# Patient Record
Sex: FEMALE | Race: WHITE | NOT HISPANIC OR LATINO | Employment: OTHER | URBAN - METROPOLITAN AREA
[De-identification: names, ages, dates, MRNs, and addresses within clinical notes are randomized per-mention and may not be internally consistent; named-entity substitution may affect disease eponyms.]

---

## 2023-04-10 DIAGNOSIS — E78.2 COMBINED HYPERLIPIDEMIA: Primary | ICD-10-CM

## 2023-04-10 PROBLEM — M96.1 LUMBAR POST-LAMINECTOMY SYNDROME: Status: ACTIVE | Noted: 2023-04-10

## 2023-04-10 PROBLEM — M46.1 INFLAMMATION OF SACROILIAC JOINT (CMS-HCC): Status: ACTIVE | Noted: 2023-04-10

## 2023-04-10 PROBLEM — M51.24 THORACIC DISC HERNIATION: Status: ACTIVE | Noted: 2023-04-10

## 2023-04-10 PROBLEM — M54.16 CHRONIC LUMBAR RADICULOPATHY: Status: ACTIVE | Noted: 2023-04-10

## 2023-04-10 PROBLEM — F41.1 GENERALIZED ANXIETY DISORDER: Status: ACTIVE | Noted: 2023-04-10

## 2023-04-10 PROBLEM — U07.1 COVID-19: Status: RESOLVED | Noted: 2023-04-10 | Resolved: 2023-04-10

## 2023-04-10 PROBLEM — I10 ESSENTIAL HYPERTENSION, BENIGN: Status: ACTIVE | Noted: 2023-04-10

## 2023-04-10 PROBLEM — M54.9 BACK PAIN: Status: ACTIVE | Noted: 2023-04-10

## 2023-04-10 PROBLEM — R92.8 ABNORMAL MAMMOGRAM: Status: ACTIVE | Noted: 2023-04-10

## 2023-04-10 PROBLEM — N39.0 UTI (URINARY TRACT INFECTION): Status: RESOLVED | Noted: 2023-04-10 | Resolved: 2023-04-10

## 2023-04-10 PROBLEM — N90.4 LICHEN SCLEROSUS OF FEMALE GENITALIA: Status: ACTIVE | Noted: 2023-04-10

## 2023-04-10 PROBLEM — M48.02 DEGENERATIVE CERVICAL SPINAL STENOSIS: Status: ACTIVE | Noted: 2023-04-10

## 2023-04-10 RX ORDER — SIMVASTATIN 40 MG/1
TABLET, FILM COATED ORAL
Qty: 90 TABLET | Refills: 3 | Status: SHIPPED | OUTPATIENT
Start: 2023-04-10 | End: 2024-02-21 | Stop reason: SDUPTHER

## 2023-04-10 RX ORDER — GABAPENTIN 300 MG/1
300 CAPSULE ORAL 2 TIMES DAILY
COMMUNITY
Start: 2020-06-03 | End: 2023-05-02 | Stop reason: SDUPTHER

## 2023-04-10 RX ORDER — MULTIVITAMIN
TABLET ORAL
COMMUNITY
End: 2023-09-25 | Stop reason: SDUPTHER

## 2023-04-10 RX ORDER — ACETAMINOPHEN 500 MG
1 TABLET ORAL DAILY
COMMUNITY

## 2023-04-10 RX ORDER — HYDROCODONE BITARTRATE AND ACETAMINOPHEN 5; 325 MG/1; MG/1
TABLET ORAL
COMMUNITY
Start: 2023-02-06 | End: 2023-09-25 | Stop reason: ALTCHOICE

## 2023-04-10 RX ORDER — LISINOPRIL AND HYDROCHLOROTHIAZIDE 20; 25 MG/1; MG/1
1 TABLET ORAL DAILY
COMMUNITY
Start: 2014-05-07 | End: 2023-04-19 | Stop reason: SINTOL

## 2023-04-10 RX ORDER — SIMVASTATIN 40 MG/1
40 TABLET, FILM COATED ORAL DAILY
COMMUNITY
Start: 2014-05-05 | End: 2023-04-17 | Stop reason: SDUPTHER

## 2023-04-10 RX ORDER — NABUMETONE 750 MG/1
TABLET, FILM COATED ORAL
COMMUNITY
Start: 2013-06-10 | End: 2023-04-24

## 2023-04-10 RX ORDER — VITAMIN E (DL,TOCOPHERYL ACET) 180 MG
1 CAPSULE ORAL DAILY
COMMUNITY
Start: 2022-02-24

## 2023-04-10 RX ORDER — LORAZEPAM 1 MG/1
TABLET ORAL
COMMUNITY

## 2023-04-17 ENCOUNTER — OFFICE VISIT (OUTPATIENT)
Dept: PRIMARY CARE | Facility: CLINIC | Age: 83
End: 2023-04-17
Payer: MEDICARE

## 2023-04-17 ENCOUNTER — LAB (OUTPATIENT)
Dept: LAB | Facility: LAB | Age: 83
End: 2023-04-17
Payer: MEDICARE

## 2023-04-17 VITALS
OXYGEN SATURATION: 98 % | BODY MASS INDEX: 27.56 KG/M2 | DIASTOLIC BLOOD PRESSURE: 66 MMHG | SYSTOLIC BLOOD PRESSURE: 130 MMHG | HEIGHT: 61 IN | HEART RATE: 75 BPM | WEIGHT: 146 LBS

## 2023-04-17 DIAGNOSIS — R82.90 FOUL SMELLING URINE: ICD-10-CM

## 2023-04-17 DIAGNOSIS — N30.00 ACUTE CYSTITIS WITHOUT HEMATURIA: ICD-10-CM

## 2023-04-17 DIAGNOSIS — I10 ESSENTIAL HYPERTENSION, BENIGN: Primary | ICD-10-CM

## 2023-04-17 DIAGNOSIS — I10 ESSENTIAL HYPERTENSION, BENIGN: ICD-10-CM

## 2023-04-17 DIAGNOSIS — E78.2 COMBINED HYPERLIPIDEMIA: ICD-10-CM

## 2023-04-17 PROBLEM — M46.1 INFLAMMATION OF SACROILIAC JOINT (CMS-HCC): Status: RESOLVED | Noted: 2023-04-10 | Resolved: 2023-04-17

## 2023-04-17 LAB
ALANINE AMINOTRANSFERASE (SGPT) (U/L) IN SER/PLAS: 20 U/L (ref 7–45)
ALBUMIN (G/DL) IN SER/PLAS: 3.6 G/DL (ref 3.4–5)
ALKALINE PHOSPHATASE (U/L) IN SER/PLAS: 68 U/L (ref 33–136)
ANION GAP IN SER/PLAS: 10 MMOL/L (ref 10–20)
ASPARTATE AMINOTRANSFERASE (SGOT) (U/L) IN SER/PLAS: 25 U/L (ref 9–39)
BASOPHILS (10*3/UL) IN BLOOD BY AUTOMATED COUNT: 0.04 X10E9/L (ref 0–0.1)
BASOPHILS/100 LEUKOCYTES IN BLOOD BY AUTOMATED COUNT: 0.7 % (ref 0–2)
BILIRUBIN TOTAL (MG/DL) IN SER/PLAS: 0.3 MG/DL (ref 0–1.2)
CALCIUM (MG/DL) IN SER/PLAS: 9.6 MG/DL (ref 8.6–10.3)
CARBON DIOXIDE, TOTAL (MMOL/L) IN SER/PLAS: 27 MMOL/L (ref 21–32)
CHLORIDE (MMOL/L) IN SER/PLAS: 96 MMOL/L (ref 98–107)
CHOLESTEROL (MG/DL) IN SER/PLAS: 125 MG/DL (ref 0–199)
CHOLESTEROL IN HDL (MG/DL) IN SER/PLAS: 45.9 MG/DL
CHOLESTEROL/HDL RATIO: 2.7
CREATININE (MG/DL) IN SER/PLAS: 0.85 MG/DL (ref 0.5–1.05)
EOSINOPHILS (10*3/UL) IN BLOOD BY AUTOMATED COUNT: 0.28 X10E9/L (ref 0–0.4)
EOSINOPHILS/100 LEUKOCYTES IN BLOOD BY AUTOMATED COUNT: 5.2 % (ref 0–6)
ERYTHROCYTE DISTRIBUTION WIDTH (RATIO) BY AUTOMATED COUNT: 12.9 % (ref 11.5–14.5)
ERYTHROCYTE MEAN CORPUSCULAR HEMOGLOBIN CONCENTRATION (G/DL) BY AUTOMATED: 32 G/DL (ref 32–36)
ERYTHROCYTE MEAN CORPUSCULAR VOLUME (FL) BY AUTOMATED COUNT: 101 FL (ref 80–100)
ERYTHROCYTES (10*6/UL) IN BLOOD BY AUTOMATED COUNT: 3.51 X10E12/L (ref 4–5.2)
GFR FEMALE: 69 ML/MIN/1.73M2
GLUCOSE (MG/DL) IN SER/PLAS: 95 MG/DL (ref 74–99)
HEMATOCRIT (%) IN BLOOD BY AUTOMATED COUNT: 35.6 % (ref 36–46)
HEMOGLOBIN (G/DL) IN BLOOD: 11.4 G/DL (ref 12–16)
IMMATURE GRANULOCYTES/100 LEUKOCYTES IN BLOOD BY AUTOMATED COUNT: 0.2 % (ref 0–0.9)
LEUKOCYTES (10*3/UL) IN BLOOD BY AUTOMATED COUNT: 5.4 X10E9/L (ref 4.4–11.3)
LYMPHOCYTES (10*3/UL) IN BLOOD BY AUTOMATED COUNT: 1.63 X10E9/L (ref 0.8–3)
LYMPHOCYTES/100 LEUKOCYTES IN BLOOD BY AUTOMATED COUNT: 30.1 % (ref 13–44)
MONOCYTES (10*3/UL) IN BLOOD BY AUTOMATED COUNT: 0.57 X10E9/L (ref 0.05–0.8)
MONOCYTES/100 LEUKOCYTES IN BLOOD BY AUTOMATED COUNT: 10.5 % (ref 2–10)
NEUTROPHILS (10*3/UL) IN BLOOD BY AUTOMATED COUNT: 2.89 X10E9/L (ref 1.6–5.5)
NEUTROPHILS/100 LEUKOCYTES IN BLOOD BY AUTOMATED COUNT: 53.3 % (ref 40–80)
NON-HDL CHOLESTEROL: 79 MG/DL
PLATELETS (10*3/UL) IN BLOOD AUTOMATED COUNT: 218 X10E9/L (ref 150–450)
POC APPEARANCE, URINE: ABNORMAL
POC BILIRUBIN, URINE: NEGATIVE
POC BLOOD, URINE: NEGATIVE
POC COLOR, URINE: YELLOW
POC GLUCOSE, URINE: NEGATIVE MG/DL
POC KETONES, URINE: NEGATIVE MG/DL
POC LEUKOCYTES, URINE: ABNORMAL
POC NITRITE,URINE: POSITIVE
POC PH, URINE: 7 PH
POC PROTEIN, URINE: NEGATIVE MG/DL
POC SPECIFIC GRAVITY, URINE: 1.01
POC UROBILINOGEN, URINE: 0.2 EU/DL
POTASSIUM (MMOL/L) IN SER/PLAS: 4.3 MMOL/L (ref 3.5–5.3)
PROTEIN TOTAL: 8 G/DL (ref 6.4–8.2)
SODIUM (MMOL/L) IN SER/PLAS: 129 MMOL/L (ref 136–145)
UREA NITROGEN (MG/DL) IN SER/PLAS: 19 MG/DL (ref 6–23)

## 2023-04-17 PROCEDURE — 36415 COLL VENOUS BLD VENIPUNCTURE: CPT

## 2023-04-17 PROCEDURE — 83718 ASSAY OF LIPOPROTEIN: CPT

## 2023-04-17 PROCEDURE — 82465 ASSAY BLD/SERUM CHOLESTEROL: CPT

## 2023-04-17 PROCEDURE — 1159F MED LIST DOCD IN RCRD: CPT | Performed by: FAMILY MEDICINE

## 2023-04-17 PROCEDURE — 1157F ADVNC CARE PLAN IN RCRD: CPT | Performed by: FAMILY MEDICINE

## 2023-04-17 PROCEDURE — 85025 COMPLETE CBC W/AUTO DIFF WBC: CPT

## 2023-04-17 PROCEDURE — 99214 OFFICE O/P EST MOD 30 MIN: CPT | Performed by: FAMILY MEDICINE

## 2023-04-17 PROCEDURE — 87086 URINE CULTURE/COLONY COUNT: CPT

## 2023-04-17 PROCEDURE — 80053 COMPREHEN METABOLIC PANEL: CPT

## 2023-04-17 PROCEDURE — 3075F SYST BP GE 130 - 139MM HG: CPT | Performed by: FAMILY MEDICINE

## 2023-04-17 PROCEDURE — 1036F TOBACCO NON-USER: CPT | Performed by: FAMILY MEDICINE

## 2023-04-17 PROCEDURE — 87186 SC STD MICRODIL/AGAR DIL: CPT

## 2023-04-17 PROCEDURE — 3078F DIAST BP <80 MM HG: CPT | Performed by: FAMILY MEDICINE

## 2023-04-17 PROCEDURE — 81003 URINALYSIS AUTO W/O SCOPE: CPT | Performed by: FAMILY MEDICINE

## 2023-04-17 PROCEDURE — 87077 CULTURE AEROBIC IDENTIFY: CPT

## 2023-04-17 PROCEDURE — 1160F RVW MEDS BY RX/DR IN RCRD: CPT | Performed by: FAMILY MEDICINE

## 2023-04-17 RX ORDER — NITROFURANTOIN (MACROCRYSTALS) 100 MG/1
100 CAPSULE ORAL 2 TIMES DAILY
Qty: 10 CAPSULE | Refills: 0 | Status: SHIPPED | OUTPATIENT
Start: 2023-04-17 | End: 2023-04-22

## 2023-04-17 ASSESSMENT — PATIENT HEALTH QUESTIONNAIRE - PHQ9
1. LITTLE INTEREST OR PLEASURE IN DOING THINGS: NOT AT ALL
SUM OF ALL RESPONSES TO PHQ9 QUESTIONS 1 AND 2: 0
2. FEELING DOWN, DEPRESSED OR HOPELESS: NOT AT ALL

## 2023-04-17 ASSESSMENT — ENCOUNTER SYMPTOMS
UNEXPECTED WEIGHT CHANGE: 0
NAUSEA: 0
APPETITE CHANGE: 0

## 2023-04-17 NOTE — PROGRESS NOTES
"Subjective   Patient ID: Kavita Butts is a 82 y.o. female who presents for UTI (R/O UTI, urine has been smelling foul for about a month. Stopped the Melatonin thought it might be causing the odor, \"terribly repulsive\"/Saw Dr. Alexander uro gynocologist she \"jacked up the bladder and shortened the vagina\").    UTI   Pertinent negatives include no nausea.      Fever chills no nausea vomiting no chest pain shortness of breath or edema  Blood pressure and cholesterol are treated  Review of Systems   Constitutional:  Negative for appetite change and unexpected weight change.   Eyes:  Negative for visual disturbance.   Gastrointestinal:  Negative for nausea.       Objective   /66   Pulse 75   Ht 1.549 m (5' 1\")   Wt 66.2 kg (146 lb)   SpO2 98%   BMI 27.59 kg/m²     Physical Exam  HENT:      Head: Normocephalic and atraumatic.      Nose: Nose normal.      Mouth/Throat:      Mouth: Mucous membranes are moist.      Pharynx: No oropharyngeal exudate.   Eyes:      Extraocular Movements: Extraocular movements intact.      Conjunctiva/sclera: Conjunctivae normal.      Pupils: Pupils are equal, round, and reactive to light.   Cardiovascular:      Rate and Rhythm: Normal rate and regular rhythm.   Pulmonary:      Effort: Pulmonary effort is normal.   Abdominal:      General: There is no distension.      Palpations: Abdomen is soft.   Musculoskeletal:      Cervical back: Normal range of motion and neck supple.   Lymphadenopathy:      Cervical: No cervical adenopathy.   Neurological:      General: No focal deficit present.      Mental Status: She is alert.   Psychiatric:         Attention and Perception: Attention normal.         Speech: Speech normal.         Behavior: Behavior is cooperative.     No CVAT    Assessment/Plan   Diagnoses and all orders for this visit:  Essential hypertension, benign  Comments:  Controlled  Orders:  -     CBC and Auto Differential; Future  -     Comprehensive Metabolic Panel; Future  -    "  Lipid Panel Non-Fasting; Future  Foul smelling urine  Comments:  UA urine culture  Orders:  -     POCT UA Automated manually resulted  Combined hyperlipidemia  Comments:  Treated  Orders:  -     CBC and Auto Differential; Future  -     Comprehensive Metabolic Panel; Future  -     Lipid Panel Non-Fasting; Future  Acute cystitis without hematuria  Comments:  Add antibiotic and push water  Orders:  -     Urine Culture  -     nitrofurantoin (Macrodantin) 100 mg capsule; Take 1 capsule (100 mg) by mouth in the morning and 1 capsule (100 mg) before bedtime. Do all this for 5 days.  Recheck 6 months sooner if any problems arise

## 2023-04-19 ENCOUNTER — TELEPHONE (OUTPATIENT)
Dept: PRIMARY CARE | Facility: CLINIC | Age: 83
End: 2023-04-19
Payer: MEDICARE

## 2023-04-19 DIAGNOSIS — I10 ESSENTIAL HYPERTENSION, BENIGN: ICD-10-CM

## 2023-04-19 DIAGNOSIS — E87.1 HYPONATREMIA: ICD-10-CM

## 2023-04-19 NOTE — TELEPHONE ENCOUNTER
----- Message from Dalton Norris MD sent at 4/18/2023  9:56 AM EDT -----  Sugar and kidney function as well as liver tests all look normal  Patient has a very slight anemia, I would encourage her to eat a well-balanced diet and consider adding a once a day Centrum Silver type vitamin  Cholesterol all looks good  Sodium and chloride are low and the blood, we will need to readjust medication as a result  Please stop lisinopril hydrochlorothiazide 20/25 and start lisinopril hydrochlorothiazide 20/12.5 mg daily  Please write for prescription for new medication and set up for me and DC previous dosing  Recheck BMP 1 month

## 2023-04-19 NOTE — TELEPHONE ENCOUNTER
Pt. Notified and agreeable, would like script to [facility] and will repeat BMP in 1 month with also switch up her MVI

## 2023-04-20 LAB — URINE CULTURE: ABNORMAL

## 2023-04-20 RX ORDER — LISINOPRIL AND HYDROCHLOROTHIAZIDE 12.5; 2 MG/1; MG/1
1 TABLET ORAL DAILY
Qty: 90 TABLET | Refills: 0 | Status: SHIPPED | OUTPATIENT
Start: 2023-04-20 | End: 2023-11-28 | Stop reason: SDUPTHER

## 2023-04-20 NOTE — TELEPHONE ENCOUNTER
Pt. Notified. Of culture result. Also contacted [facility] and they have received the correct dose of Lisinopril hydrochlorothiazide 20/12.5mg, pt. Notified and will  tomorrow.

## 2023-04-20 NOTE — TELEPHONE ENCOUNTER
PT called regarding refill request / change in dose. It looks like the pharmacy sent a request to confirm the medication (PT states Lisinopril) Please call PT, requested

## 2023-04-24 DIAGNOSIS — M48.02 DEGENERATIVE CERVICAL SPINAL STENOSIS: Primary | ICD-10-CM

## 2023-04-24 PROBLEM — Q67.5 CONGENITAL DEFORMITY OF SPINE: Status: ACTIVE | Noted: 2023-01-18

## 2023-04-24 PROBLEM — I10 HYPERTENSION: Status: ACTIVE | Noted: 2023-04-24

## 2023-04-24 RX ORDER — LANOLIN ALCOHOL/MO/W.PET/CERES
1000 CREAM (GRAM) TOPICAL DAILY
COMMUNITY
Start: 2023-01-13 | End: 2023-09-25 | Stop reason: ALTCHOICE

## 2023-04-24 RX ORDER — DOCUSATE SODIUM 100 MG/1
CAPSULE, LIQUID FILLED ORAL
COMMUNITY
Start: 2023-01-20

## 2023-04-24 RX ORDER — NABUMETONE 750 MG/1
TABLET, FILM COATED ORAL
Qty: 90 TABLET | Refills: 3 | Status: SHIPPED | OUTPATIENT
Start: 2023-04-24 | End: 2023-10-17 | Stop reason: SDUPTHER

## 2023-04-24 RX ORDER — ACETAMINOPHEN 500 MG
125 TABLET ORAL
COMMUNITY
Start: 2023-01-13 | End: 2023-09-25 | Stop reason: SDUPTHER

## 2023-05-01 DIAGNOSIS — M54.16 CHRONIC LUMBAR RADICULOPATHY: ICD-10-CM

## 2023-05-01 NOTE — TELEPHONE ENCOUNTER
Rx Refill Request Telephone Encounter    Name:  Kavita Butts  :  [id number]  Medication Name:  Gabapentin  300 mg    bid  90 days    Specific Pharmacy location:  [facility]  Date of last appointment:  2023  Date of next appointment:  na  Best number to reach patient:  [phone]

## 2023-05-02 RX ORDER — GABAPENTIN 300 MG/1
300 CAPSULE ORAL 2 TIMES DAILY
Qty: 180 CAPSULE | Refills: 1 | Status: SHIPPED | OUTPATIENT
Start: 2023-05-02 | End: 2023-10-17 | Stop reason: SDUPTHER

## 2023-05-22 ENCOUNTER — LAB (OUTPATIENT)
Dept: LAB | Facility: LAB | Age: 83
End: 2023-05-22
Payer: MEDICARE

## 2023-05-22 DIAGNOSIS — I10 ESSENTIAL HYPERTENSION, BENIGN: ICD-10-CM

## 2023-05-22 DIAGNOSIS — E87.1 HYPONATREMIA: ICD-10-CM

## 2023-05-22 LAB
ANION GAP IN SER/PLAS: 11 MMOL/L (ref 10–20)
CALCIUM (MG/DL) IN SER/PLAS: 9.7 MG/DL (ref 8.6–10.3)
CARBON DIOXIDE, TOTAL (MMOL/L) IN SER/PLAS: 29 MMOL/L (ref 21–32)
CHLORIDE (MMOL/L) IN SER/PLAS: 96 MMOL/L (ref 98–107)
CREATININE (MG/DL) IN SER/PLAS: 0.73 MG/DL (ref 0.5–1.05)
GFR FEMALE: 83 ML/MIN/1.73M2
GLUCOSE (MG/DL) IN SER/PLAS: 104 MG/DL (ref 74–99)
POTASSIUM (MMOL/L) IN SER/PLAS: 4.1 MMOL/L (ref 3.5–5.3)
SODIUM (MMOL/L) IN SER/PLAS: 132 MMOL/L (ref 136–145)
UREA NITROGEN (MG/DL) IN SER/PLAS: 15 MG/DL (ref 6–23)

## 2023-05-22 PROCEDURE — 80048 BASIC METABOLIC PNL TOTAL CA: CPT

## 2023-05-22 PROCEDURE — 36415 COLL VENOUS BLD VENIPUNCTURE: CPT

## 2023-05-23 ENCOUNTER — TELEPHONE (OUTPATIENT)
Dept: PRIMARY CARE | Facility: CLINIC | Age: 83
End: 2023-05-23
Payer: MEDICARE

## 2023-05-23 NOTE — TELEPHONE ENCOUNTER
Result Communication    Resulted Orders   Basic metabolic panel   Result Value Ref Range    Glucose 104 (H) 74 - 99 mg/dL    Sodium 132 (L) 136 - 145 mmol/L    Potassium 4.1 3.5 - 5.3 mmol/L    Chloride 96 (L) 98 - 107 mmol/L    Bicarbonate 29 21 - 32 mmol/L    Anion Gap 11 10 - 20 mmol/L    Urea Nitrogen 15 6 - 23 mg/dL    Creatinine 0.73 0.50 - 1.05 mg/dL    GFR Female 83 >90 mL/min/1.73m2      Comment:       CALCULATIONS OF ESTIMATED GFR ARE PERFORMED   USING THE 2021 CKD-EPI STUDY REFIT EQUATION   WITHOUT THE RACE VARIABLE FOR THE IDMS-TRACEABLE   CREATININE METHODS.    https://jasn.asnjournals.org/content/early/2021/09/22/ASN.7248941111    Calcium 9.7 8.6 - 10.3 mg/dL       3:24 PM      Results were successfully communicated with the patient and they acknowledged their understanding. Patient was not fasting for this test.

## 2023-08-10 ENCOUNTER — TELEPHONE (OUTPATIENT)
Dept: PRIMARY CARE | Facility: CLINIC | Age: 83
End: 2023-08-10
Payer: MEDICARE

## 2023-08-10 DIAGNOSIS — Z12.31 VISIT FOR SCREENING MAMMOGRAM: ICD-10-CM

## 2023-08-10 NOTE — TELEPHONE ENCOUNTER
Pt is requesting a yearly mammogram and is requesting a call from the nurse regarding the way this would be scheduled. PT would also like to know is she schould be scheduled to see Dr CROCKETT (Last apt 4/17/23)

## 2023-08-11 NOTE — TELEPHONE ENCOUNTER
Order placed, pt. Notified she will call to schedule. Advised JU usually likes to see her q 6 months so if she calls can arrange an appt. For Oct.

## 2023-08-29 ENCOUNTER — TELEPHONE (OUTPATIENT)
Dept: PRIMARY CARE | Facility: CLINIC | Age: 83
End: 2023-08-29
Payer: MEDICARE

## 2023-08-29 NOTE — TELEPHONE ENCOUNTER
Result Communication    Resulted Orders   BI mammo bilateral screening tomosynthesis    Narrative    Interpreted By:  CHRISTIE MANCUSO MD  MRN: [id number]  Patient Name: DANAE LANZA     STUDY:  Bilateral digital screening mammograms with tomosynthesis imaging  dated  8/28/2023.     INDICATION:  Screening.     COMPARISON:  Mammograms dated  07/23/2020 through 08/09/2022.     ACCESSION NUMBER(S):  [id number]     ORDERING CLINICIAN:  PATRICIA ABDI     TECHNIQUE:  Bilateral  CC and MLO  2D digital mammograms and digital breast  tomosynthesis mammograms were obtained.   CAD was utilized for the  interpretation of these mammograms.     FINDINGS:  There are areas of scattered fibroglandular tissue.     No suspicious  mass or asymmetry is evident.  No suspicious calcification or  architectural distortion is evident.  The breasts are similar to  prior mammograms.       Impression    No mammographic finding is evident to suggest malignancy.     BI-RADS CATEGORY:     Category: 1 - Negative.  Recommendation:  1 Year Screening.       6:08 PM      Results were successfully communicated with the patient and they acknowledged their understanding.

## 2023-09-06 ENCOUNTER — APPOINTMENT (OUTPATIENT)
Dept: PRIMARY CARE | Facility: CLINIC | Age: 83
End: 2023-09-06
Payer: MEDICARE

## 2023-09-25 ENCOUNTER — OFFICE VISIT (OUTPATIENT)
Dept: PRIMARY CARE | Facility: CLINIC | Age: 83
End: 2023-09-25
Payer: MEDICARE

## 2023-09-25 VITALS
DIASTOLIC BLOOD PRESSURE: 76 MMHG | WEIGHT: 145 LBS | BODY MASS INDEX: 27.4 KG/M2 | SYSTOLIC BLOOD PRESSURE: 138 MMHG | HEART RATE: 60 BPM | OXYGEN SATURATION: 96 %

## 2023-09-25 DIAGNOSIS — R07.89 COSTOCHONDRAL CHEST PAIN: Primary | ICD-10-CM

## 2023-09-25 DIAGNOSIS — I10 PRIMARY HYPERTENSION: ICD-10-CM

## 2023-09-25 PROCEDURE — 1036F TOBACCO NON-USER: CPT | Performed by: FAMILY MEDICINE

## 2023-09-25 PROCEDURE — 3075F SYST BP GE 130 - 139MM HG: CPT | Performed by: FAMILY MEDICINE

## 2023-09-25 PROCEDURE — 1159F MED LIST DOCD IN RCRD: CPT | Performed by: FAMILY MEDICINE

## 2023-09-25 PROCEDURE — 3078F DIAST BP <80 MM HG: CPT | Performed by: FAMILY MEDICINE

## 2023-09-25 PROCEDURE — 1160F RVW MEDS BY RX/DR IN RCRD: CPT | Performed by: FAMILY MEDICINE

## 2023-09-25 PROCEDURE — 99213 OFFICE O/P EST LOW 20 MIN: CPT | Performed by: FAMILY MEDICINE

## 2023-09-25 PROCEDURE — 1126F AMNT PAIN NOTED NONE PRSNT: CPT | Performed by: FAMILY MEDICINE

## 2023-09-25 RX ORDER — ASCORBIC ACID 300 MG
TABLET,CHEWABLE ORAL DAILY
COMMUNITY

## 2023-09-25 RX ORDER — PREDNISONE 10 MG/1
TABLET ORAL
Qty: 14 TABLET | Refills: 0 | Status: SHIPPED | OUTPATIENT
Start: 2023-09-25 | End: 2023-10-03

## 2023-09-25 ASSESSMENT — ENCOUNTER SYMPTOMS
PAIN: 1
APPETITE CHANGE: 0
UNEXPECTED WEIGHT CHANGE: 0
NAUSEA: 0

## 2023-09-25 NOTE — PROGRESS NOTES
Subjective   Patient ID: Kavita Butts is a 83 y.o. female who presents for Pain (Under L rib cage, it's a burning pain and some tenderness to touch the rib cage. has been coughing a lot with allergy thinks it was due to mold on echinacea she cut back, has always had trouble with arnold natalia ).    Pain  Pertinent negatives include no nausea.   Started with a lot of coughing due to allergies  Her cough is much improved but her ribs are tender  No deformity no fever chills    Review of Systems   Constitutional:  Negative for appetite change and unexpected weight change.   Eyes:  Negative for visual disturbance.   Gastrointestinal:  Negative for nausea.       Objective   /76   Pulse 60   Wt 65.8 kg (145 lb)   SpO2 96%   BMI 27.40 kg/m²     Physical Exam  HENT:      Head: Normocephalic and atraumatic.      Nose: Nose normal.      Mouth/Throat:      Mouth: Mucous membranes are moist.      Pharynx: No oropharyngeal exudate.   Eyes:      Extraocular Movements: Extraocular movements intact.      Conjunctiva/sclera: Conjunctivae normal.      Pupils: Pupils are equal, round, and reactive to light.   Cardiovascular:      Rate and Rhythm: Normal rate and regular rhythm.   Pulmonary:      Effort: Pulmonary effort is normal.   Abdominal:      General: There is no distension.      Palpations: Abdomen is soft.   Musculoskeletal:      Cervical back: Normal range of motion and neck supple.   Lymphadenopathy:      Cervical: No cervical adenopathy.   Neurological:      General: No focal deficit present.      Mental Status: She is alert.   Psychiatric:         Attention and Perception: Attention normal.         Speech: Speech normal.         Behavior: Behavior is cooperative.     Left inferior costal margin tender from near xiphoid to mid axillary line    Assessment/Plan   Diagnoses and all orders for this visit:  Costochondral chest pain  Comments:  Add medication and discussed conservative care  Orders:  -     predniSONE  (Deltasone) 10 mg tablet; Take 1 tablet (10 mg) by mouth 3 times a day for 2 days, THEN 1 tablet (10 mg) 2 times a day for 2 days, THEN 1 tablet (10 mg) once daily for 4 days.  Primary hypertension  Comments:  Treated and controlled  Reviewed labs  Recheck 1 week if not better sooner if worse

## 2023-10-16 PROBLEM — M48.061 LUMBAR SPINAL STENOSIS: Status: ACTIVE | Noted: 2023-10-16

## 2023-10-16 PROBLEM — R35.0 URINARY FREQUENCY: Status: ACTIVE | Noted: 2023-10-16

## 2023-10-17 ENCOUNTER — OFFICE VISIT (OUTPATIENT)
Dept: PRIMARY CARE | Facility: CLINIC | Age: 83
End: 2023-10-17
Payer: MEDICARE

## 2023-10-17 VITALS
OXYGEN SATURATION: 96 % | SYSTOLIC BLOOD PRESSURE: 128 MMHG | HEIGHT: 61 IN | BODY MASS INDEX: 27.72 KG/M2 | DIASTOLIC BLOOD PRESSURE: 72 MMHG | HEART RATE: 67 BPM | WEIGHT: 146.8 LBS

## 2023-10-17 DIAGNOSIS — E78.2 COMBINED HYPERLIPIDEMIA: ICD-10-CM

## 2023-10-17 DIAGNOSIS — M48.02 DEGENERATIVE CERVICAL SPINAL STENOSIS: ICD-10-CM

## 2023-10-17 DIAGNOSIS — M54.16 CHRONIC LUMBAR RADICULOPATHY: ICD-10-CM

## 2023-10-17 DIAGNOSIS — I10 ESSENTIAL HYPERTENSION, BENIGN: Primary | ICD-10-CM

## 2023-10-17 DIAGNOSIS — F41.1 GENERALIZED ANXIETY DISORDER: ICD-10-CM

## 2023-10-17 PROCEDURE — 3074F SYST BP LT 130 MM HG: CPT | Performed by: FAMILY MEDICINE

## 2023-10-17 PROCEDURE — 1036F TOBACCO NON-USER: CPT | Performed by: FAMILY MEDICINE

## 2023-10-17 PROCEDURE — 99214 OFFICE O/P EST MOD 30 MIN: CPT | Performed by: FAMILY MEDICINE

## 2023-10-17 PROCEDURE — 3078F DIAST BP <80 MM HG: CPT | Performed by: FAMILY MEDICINE

## 2023-10-17 PROCEDURE — 1159F MED LIST DOCD IN RCRD: CPT | Performed by: FAMILY MEDICINE

## 2023-10-17 PROCEDURE — 1160F RVW MEDS BY RX/DR IN RCRD: CPT | Performed by: FAMILY MEDICINE

## 2023-10-17 PROCEDURE — 1126F AMNT PAIN NOTED NONE PRSNT: CPT | Performed by: FAMILY MEDICINE

## 2023-10-17 RX ORDER — GABAPENTIN 300 MG/1
300 CAPSULE ORAL 2 TIMES DAILY
Qty: 180 CAPSULE | Refills: 1 | Status: SHIPPED | OUTPATIENT
Start: 2023-10-17 | End: 2024-01-02 | Stop reason: SDUPTHER

## 2023-10-17 RX ORDER — NABUMETONE 750 MG/1
TABLET, FILM COATED ORAL
Qty: 90 TABLET | Refills: 3 | Status: SHIPPED | OUTPATIENT
Start: 2023-10-17 | End: 2024-03-29 | Stop reason: SDUPTHER

## 2023-10-17 ASSESSMENT — ENCOUNTER SYMPTOMS
APPETITE CHANGE: 0
NAUSEA: 0
UNEXPECTED WEIGHT CHANGE: 0

## 2023-10-17 NOTE — PROGRESS NOTES
"Subjective   Patient ID: Kavita Butts is a 83 y.o. female who presents for Follow-up (6 month, tenderness on left side under ribs ).    HPI   Patient has hx of stable hypertension, hyperlipidemia.  Pt denies chest pain, shortness of breath and edema.  Patient's current treatment as listed in Rx.  Patient is compliant with treatment and complains of no side effects associated treatment.    Review of Systems   Constitutional:  Negative for appetite change and unexpected weight change.   Eyes:  Negative for visual disturbance.   Gastrointestinal:  Negative for nausea.       Objective   /72   Pulse 67   Ht 1.549 m (5' 1\")   Wt 66.6 kg (146 lb 12.8 oz)   SpO2 96%   BMI 27.74 kg/m²     Physical Exam  HENT:      Head: Normocephalic and atraumatic.      Nose: Nose normal.      Mouth/Throat:      Mouth: Mucous membranes are moist.      Pharynx: No oropharyngeal exudate.   Eyes:      Extraocular Movements: Extraocular movements intact.      Conjunctiva/sclera: Conjunctivae normal.      Pupils: Pupils are equal, round, and reactive to light.   Cardiovascular:      Rate and Rhythm: Normal rate and regular rhythm.   Pulmonary:      Effort: Pulmonary effort is normal.   Abdominal:      General: There is no distension.      Palpations: Abdomen is soft.   Musculoskeletal:      Cervical back: Normal range of motion and neck supple.   Lymphadenopathy:      Cervical: No cervical adenopathy.   Neurological:      General: No focal deficit present.      Mental Status: She is alert.   Psychiatric:         Attention and Perception: Attention normal.         Speech: Speech normal.         Behavior: Behavior is cooperative.         Assessment/Plan   Diagnoses and all orders for this visit:  Essential hypertension, benign  Comments:  Treated and controlled  Orders:  -     Lipid Panel; Future  -     CBC and Auto Differential; Future  -     Comprehensive Metabolic Panel; Future  Chronic lumbar radiculopathy  Comments:  Controlled " with medication  Orders:  -     gabapentin (Neurontin) 300 mg capsule; Take 1 capsule (300 mg) by mouth 2 times a day.  -     Lipid Panel; Future  -     CBC and Auto Differential; Future  -     Comprehensive Metabolic Panel; Future  Degenerative cervical spinal stenosis  -     nabumetone (Relafen) 750 mg tablet; TAKE ONE-HALF (1/2) TABLET TWICE A DAY AS NEEDED  -     Lipid Panel; Future  -     CBC and Auto Differential; Future  -     Comprehensive Metabolic Panel; Future  Combined hyperlipidemia  Comments:  Treated and controlled  Orders:  -     Lipid Panel; Future  -     CBC and Auto Differential; Future  -     Comprehensive Metabolic Panel; Future  Generalized anxiety disorder  -     Lipid Panel; Future  -     CBC and Auto Differential; Future  -     Comprehensive Metabolic Panel; Future  HM LM discussed  Recheck 6 months sooner if any issues arise

## 2023-10-18 ENCOUNTER — LAB (OUTPATIENT)
Dept: LAB | Facility: LAB | Age: 83
End: 2023-10-18
Payer: MEDICARE

## 2023-10-18 DIAGNOSIS — E78.2 COMBINED HYPERLIPIDEMIA: ICD-10-CM

## 2023-10-18 DIAGNOSIS — F41.1 GENERALIZED ANXIETY DISORDER: ICD-10-CM

## 2023-10-18 DIAGNOSIS — M54.16 CHRONIC LUMBAR RADICULOPATHY: ICD-10-CM

## 2023-10-18 DIAGNOSIS — I10 ESSENTIAL HYPERTENSION, BENIGN: ICD-10-CM

## 2023-10-18 DIAGNOSIS — D72.819 LEUKOPENIA, UNSPECIFIED TYPE: Primary | ICD-10-CM

## 2023-10-18 DIAGNOSIS — M48.02 DEGENERATIVE CERVICAL SPINAL STENOSIS: ICD-10-CM

## 2023-10-18 LAB
ALBUMIN SERPL BCP-MCNC: 3.7 G/DL (ref 3.4–5)
ALP SERPL-CCNC: 64 U/L (ref 33–136)
ALT SERPL W P-5'-P-CCNC: 20 U/L (ref 7–45)
ANION GAP SERPL CALC-SCNC: 11 MMOL/L (ref 10–20)
AST SERPL W P-5'-P-CCNC: 26 U/L (ref 9–39)
BASOPHILS # BLD AUTO: 0.03 X10*3/UL (ref 0–0.1)
BASOPHILS NFR BLD AUTO: 0.8 %
BILIRUB SERPL-MCNC: 0.4 MG/DL (ref 0–1.2)
BUN SERPL-MCNC: 17 MG/DL (ref 6–23)
CALCIUM SERPL-MCNC: 9.2 MG/DL (ref 8.6–10.3)
CHLORIDE SERPL-SCNC: 100 MMOL/L (ref 98–107)
CHOLEST SERPL-MCNC: 116 MG/DL (ref 0–199)
CHOLESTEROL/HDL RATIO: 2.2
CO2 SERPL-SCNC: 27 MMOL/L (ref 21–32)
CREAT SERPL-MCNC: 0.74 MG/DL (ref 0.5–1.05)
EOSINOPHIL # BLD AUTO: 0.28 X10*3/UL (ref 0–0.4)
EOSINOPHIL NFR BLD AUTO: 7.3 %
ERYTHROCYTE [DISTWIDTH] IN BLOOD BY AUTOMATED COUNT: 13.2 % (ref 11.5–14.5)
GFR SERPL CREATININE-BSD FRML MDRD: 81 ML/MIN/1.73M*2
GLUCOSE SERPL-MCNC: 91 MG/DL (ref 74–99)
HCT VFR BLD AUTO: 38.8 % (ref 36–46)
HDLC SERPL-MCNC: 53.3 MG/DL
HGB BLD-MCNC: 12.4 G/DL (ref 12–16)
IMM GRANULOCYTES # BLD AUTO: 0 X10*3/UL (ref 0–0.5)
IMM GRANULOCYTES NFR BLD AUTO: 0 % (ref 0–0.9)
LDLC SERPL CALC-MCNC: 52 MG/DL
LYMPHOCYTES # BLD AUTO: 1.03 X10*3/UL (ref 0.8–3)
LYMPHOCYTES NFR BLD AUTO: 27 %
MCH RBC QN AUTO: 32.6 PG (ref 26–34)
MCHC RBC AUTO-ENTMCNC: 32 G/DL (ref 32–36)
MCV RBC AUTO: 102 FL (ref 80–100)
MONOCYTES # BLD AUTO: 0.46 X10*3/UL (ref 0.05–0.8)
MONOCYTES NFR BLD AUTO: 12.1 %
NEUTROPHILS # BLD AUTO: 2.01 X10*3/UL (ref 1.6–5.5)
NEUTROPHILS NFR BLD AUTO: 52.8 %
NON HDL CHOLESTEROL: 63 MG/DL (ref 0–149)
NRBC BLD-RTO: 0 /100 WBCS (ref 0–0)
PLATELET # BLD AUTO: 210 X10*3/UL (ref 150–450)
PMV BLD AUTO: 9.8 FL (ref 7.5–11.5)
POTASSIUM SERPL-SCNC: 4.2 MMOL/L (ref 3.5–5.3)
PROT SERPL-MCNC: 7.8 G/DL (ref 6.4–8.2)
RBC # BLD AUTO: 3.8 X10*6/UL (ref 4–5.2)
SODIUM SERPL-SCNC: 134 MMOL/L (ref 136–145)
TRIGL SERPL-MCNC: 53 MG/DL (ref 0–149)
VLDL: 11 MG/DL (ref 0–40)
WBC # BLD AUTO: 3.8 X10*3/UL (ref 4.4–11.3)

## 2023-10-18 PROCEDURE — 80061 LIPID PANEL: CPT

## 2023-10-18 PROCEDURE — 85025 COMPLETE CBC W/AUTO DIFF WBC: CPT

## 2023-10-18 PROCEDURE — 36415 COLL VENOUS BLD VENIPUNCTURE: CPT

## 2023-10-18 PROCEDURE — 80053 COMPREHEN METABOLIC PANEL: CPT

## 2023-10-19 ENCOUNTER — TELEPHONE (OUTPATIENT)
Dept: PRIMARY CARE | Facility: CLINIC | Age: 83
End: 2023-10-19
Payer: MEDICARE

## 2023-10-19 NOTE — TELEPHONE ENCOUNTER
Result Communication    Resulted Orders   Lipid Panel   Result Value Ref Range    Cholesterol 116 0 - 199 mg/dL      Comment:            Age      Desirable   Borderline High   High     0-19 Y     0 - 169       170 - 199     >/= 200    20-24 Y     0 - 189       190 - 224     >/= 225         >24 Y     0 - 199       200 - 239     >/= 240   **All ranges are based on fasting samples. Specific   therapeutic targets will vary based on patient-specific   cardiac risk.    Pediatric guidelines reference:Pediatrics 2011, 128(S5).Adult guidelines reference: NCEP ATPIII Guidelines,HINA 2001, 258:2486-97    Venipuncture immediately after or during the administration of Metamizole may lead to falsely low results. Testing should be performed immediately prior to Metamizole dosing.    HDL-Cholesterol 53.3 mg/dL      Comment:        Age       Very Low   Low     Normal    High    0-19 Y    < 35      < 40     40-45     ----  20-24 Y    ----     < 40      >45      ----        >24 Y      ----     < 40     40-60      >60      Cholesterol/HDL Ratio 2.2       Comment:        Ref Values  Desirable  < 3.4  High Risk  > 5.0    LDL Calculated 52 <=99 mg/dL      Comment:                                  Near   Borderline      AGE      Desirable  Optimal    High     High     Very High     0-19 Y     0 - 109     ---    110-129   >/= 130     ----    20-24 Y     0 - 119     ---    120-159   >/= 160     ----      >24 Y     0 -  99   100-129  130-159   160-189     >/=190      VLDL 11 0 - 40 mg/dL    Triglycerides 53 0 - 149 mg/dL      Comment:         Age         Desirable   Borderline High   High     Very High   0 D-90 D    19 - 174         ----         ----        ----  91 D- 9 Y     0 -  74        75 -  99     >/= 100      ----    10-19 Y     0 -  89        90 - 129     >/= 130      ----    20-24 Y     0 - 114       115 - 149     >/= 150      ----         >24 Y     0 - 149       150 - 199    200- 499    >/= 500    Venipuncture immediately after or  during the administration of Metamizole may lead to falsely low results. Testing should be performed immediately prior to Metamizole dosing.    Non HDL Cholesterol 63 0 - 149 mg/dL      Comment:            Age       Desirable   Borderline High   High     Very High     0-19 Y     0 - 119       120 - 144     >/= 145    >/= 160    20-24 Y     0 - 149       150 - 189     >/= 190      ----         >24 Y    30 mg/dL above LDL Cholesterol goal     CBC and Auto Differential   Result Value Ref Range    WBC 3.8 (L) 4.4 - 11.3 x10*3/uL    nRBC 0.0 0.0 - 0.0 /100 WBCs    RBC 3.80 (L) 4.00 - 5.20 x10*6/uL    Hemoglobin 12.4 12.0 - 16.0 g/dL    Hematocrit 38.8 36.0 - 46.0 %     (H) 80 - 100 fL    MCH 32.6 26.0 - 34.0 pg    MCHC 32.0 32.0 - 36.0 g/dL    RDW 13.2 11.5 - 14.5 %    Platelets 210 150 - 450 x10*3/uL    MPV 9.8 7.5 - 11.5 fL    Neutrophils % 52.8 40.0 - 80.0 %    Immature Granulocytes %, Automated 0.0 0.0 - 0.9 %      Comment:      Immature Granulocyte Count (IG) includes promyelocytes, myelocytes and metamyelocytes but does not include bands. Percent differential counts (%) should be interpreted in the context of the absolute cell counts (cells/UL).    Lymphocytes % 27.0 13.0 - 44.0 %    Monocytes % 12.1 2.0 - 10.0 %    Eosinophils % 7.3 0.0 - 6.0 %    Basophils % 0.8 0.0 - 2.0 %    Neutrophils Absolute 2.01 1.60 - 5.50 x10*3/uL      Comment:      Percent differential counts (%) should be interpreted in the context of the absolute cell counts (cells/uL).    Immature Granulocytes Absolute, Automated 0.00 0.00 - 0.50 x10*3/uL    Lymphocytes Absolute 1.03 0.80 - 3.00 x10*3/uL    Monocytes Absolute 0.46 0.05 - 0.80 x10*3/uL    Eosinophils Absolute 0.28 0.00 - 0.40 x10*3/uL    Basophils Absolute 0.03 0.00 - 0.10 x10*3/uL   Comprehensive Metabolic Panel   Result Value Ref Range    Glucose 91 74 - 99 mg/dL    Sodium 134 (L) 136 - 145 mmol/L    Potassium 4.2 3.5 - 5.3 mmol/L    Chloride 100 98 - 107 mmol/L     Bicarbonate 27 21 - 32 mmol/L    Anion Gap 11 10 - 20 mmol/L    Urea Nitrogen 17 6 - 23 mg/dL    Creatinine 0.74 0.50 - 1.05 mg/dL    eGFR 81 >60 mL/min/1.73m*2      Comment:      Calculations of estimated GFR are performed using the 2021 CKD-EPI Study Refit equation without the race variable for the IDMS-Traceable creatinine methods.  https://jasn.asnjournals.org/content/early/2021/09/22/ASN.7528791688    Calcium 9.2 8.6 - 10.3 mg/dL    Albumin 3.7 3.4 - 5.0 g/dL    Alkaline Phosphatase 64 33 - 136 U/L    Total Protein 7.8 6.4 - 8.2 g/dL    AST 26 9 - 39 U/L    Bilirubin, Total 0.4 0.0 - 1.2 mg/dL    ALT 20 7 - 45 U/L      Comment:      Patients treated with Sulfasalazine may generate falsely decreased results for ALT.       10:38 AM  Your white blood cell count is mildly decreased. Recommend rechecking in about a month, and follow-up with Dr. Norris.     Your sodium is slightly decreased, but appears stable for at least the last 2 years. ...   Written by Nikunj Dewey DO on 10/18/2023  4:48 PM EDT View Full Comments    Results were successfully communicated with the patient and they acknowledged their understanding.

## 2023-10-19 NOTE — TELEPHONE ENCOUNTER
----- Message from Nikunj Dewey DO sent at 10/18/2023  4:48 PM EDT -----  Please make sure patient is aware of the comments or MyChart message.

## 2023-11-17 ENCOUNTER — LAB (OUTPATIENT)
Dept: LAB | Facility: LAB | Age: 83
End: 2023-11-17
Payer: MEDICARE

## 2023-11-17 DIAGNOSIS — D72.819 LEUKOPENIA, UNSPECIFIED TYPE: ICD-10-CM

## 2023-11-17 LAB
BASOPHILS # BLD AUTO: 0.04 X10*3/UL (ref 0–0.1)
BASOPHILS NFR BLD AUTO: 0.8 %
EOSINOPHIL # BLD AUTO: 0.52 X10*3/UL (ref 0–0.4)
EOSINOPHIL NFR BLD AUTO: 10.1 %
ERYTHROCYTE [DISTWIDTH] IN BLOOD BY AUTOMATED COUNT: 13.2 % (ref 11.5–14.5)
HCT VFR BLD AUTO: 37.8 % (ref 36–46)
HGB BLD-MCNC: 12.2 G/DL (ref 12–16)
IMM GRANULOCYTES # BLD AUTO: 0.02 X10*3/UL (ref 0–0.5)
IMM GRANULOCYTES NFR BLD AUTO: 0.4 % (ref 0–0.9)
LYMPHOCYTES # BLD AUTO: 1.4 X10*3/UL (ref 0.8–3)
LYMPHOCYTES NFR BLD AUTO: 27.1 %
MCH RBC QN AUTO: 33.1 PG (ref 26–34)
MCHC RBC AUTO-ENTMCNC: 32.3 G/DL (ref 32–36)
MCV RBC AUTO: 102 FL (ref 80–100)
MONOCYTES # BLD AUTO: 0.48 X10*3/UL (ref 0.05–0.8)
MONOCYTES NFR BLD AUTO: 9.3 %
NEUTROPHILS # BLD AUTO: 2.7 X10*3/UL (ref 1.6–5.5)
NEUTROPHILS NFR BLD AUTO: 52.3 %
NRBC BLD-RTO: 0 /100 WBCS (ref 0–0)
PLATELET # BLD AUTO: 245 X10*3/UL (ref 150–450)
RBC # BLD AUTO: 3.69 X10*6/UL (ref 4–5.2)
WBC # BLD AUTO: 5.2 X10*3/UL (ref 4.4–11.3)

## 2023-11-17 PROCEDURE — 85025 COMPLETE CBC W/AUTO DIFF WBC: CPT

## 2023-11-17 PROCEDURE — 36415 COLL VENOUS BLD VENIPUNCTURE: CPT

## 2023-11-28 ENCOUNTER — OFFICE VISIT (OUTPATIENT)
Dept: PRIMARY CARE | Facility: CLINIC | Age: 83
End: 2023-11-28
Payer: MEDICARE

## 2023-11-28 VITALS
SYSTOLIC BLOOD PRESSURE: 126 MMHG | OXYGEN SATURATION: 99 % | DIASTOLIC BLOOD PRESSURE: 68 MMHG | HEART RATE: 81 BPM | TEMPERATURE: 98.4 F

## 2023-11-28 DIAGNOSIS — I10 ESSENTIAL HYPERTENSION, BENIGN: ICD-10-CM

## 2023-11-28 DIAGNOSIS — E78.2 COMBINED HYPERLIPIDEMIA: Primary | ICD-10-CM

## 2023-11-28 DIAGNOSIS — J01.00 ACUTE MAXILLARY SINUSITIS, RECURRENCE NOT SPECIFIED: ICD-10-CM

## 2023-11-28 PROCEDURE — 99214 OFFICE O/P EST MOD 30 MIN: CPT | Performed by: FAMILY MEDICINE

## 2023-11-28 PROCEDURE — 1126F AMNT PAIN NOTED NONE PRSNT: CPT | Performed by: FAMILY MEDICINE

## 2023-11-28 PROCEDURE — 1036F TOBACCO NON-USER: CPT | Performed by: FAMILY MEDICINE

## 2023-11-28 PROCEDURE — 3074F SYST BP LT 130 MM HG: CPT | Performed by: FAMILY MEDICINE

## 2023-11-28 PROCEDURE — 1159F MED LIST DOCD IN RCRD: CPT | Performed by: FAMILY MEDICINE

## 2023-11-28 PROCEDURE — 3078F DIAST BP <80 MM HG: CPT | Performed by: FAMILY MEDICINE

## 2023-11-28 PROCEDURE — 1160F RVW MEDS BY RX/DR IN RCRD: CPT | Performed by: FAMILY MEDICINE

## 2023-11-28 RX ORDER — LISINOPRIL AND HYDROCHLOROTHIAZIDE 12.5; 2 MG/1; MG/1
1 TABLET ORAL DAILY
Qty: 90 TABLET | Refills: 1 | Status: SHIPPED | OUTPATIENT
Start: 2023-11-28 | End: 2024-04-24 | Stop reason: SDUPTHER

## 2023-11-28 RX ORDER — AZITHROMYCIN 250 MG/1
TABLET, FILM COATED ORAL
Qty: 6 TABLET | Refills: 0 | Status: SHIPPED | OUTPATIENT
Start: 2023-11-28 | End: 2023-12-02

## 2023-11-28 ASSESSMENT — ENCOUNTER SYMPTOMS
NAUSEA: 0
UNEXPECTED WEIGHT CHANGE: 0
APPETITE CHANGE: 0
COUGH: 1

## 2023-11-28 NOTE — PROGRESS NOTES
Onset 5 days ago with cough and pain across her forehead used some cough drops coughed terribly this am and got a lot of green mucus up nasal drainage is clear feels like a clothes pin is on her nose today No fevers COVID neg test 5 days ago.   Is flying next week and would like this cleared up   Subjective   Patient ID: Kavita Butts is a 83 y.o. female who presents for Cough.    Cough     Patient is seen in the office complaining of sinus congestion.  Complains of stuffed up head, drainage, cough.  Patient complains symptoms are moderate and patient feels mildly ill.  Patient also notes associated fatigue.     Review of Systems   Constitutional:  Negative for appetite change and unexpected weight change.   Eyes:  Negative for visual disturbance.   Respiratory:  Positive for cough.    Gastrointestinal:  Negative for nausea.       Objective   /68   Pulse 81   Temp 36.9 °C (98.4 °F)   SpO2 99%     Physical Exam  HENT:      Head: Normocephalic and atraumatic.      Nose: Nose normal.      Mouth/Throat:      Mouth: Mucous membranes are moist.      Pharynx: No oropharyngeal exudate.   Eyes:      Extraocular Movements: Extraocular movements intact.      Conjunctiva/sclera: Conjunctivae normal.      Pupils: Pupils are equal, round, and reactive to light.   Cardiovascular:      Rate and Rhythm: Normal rate and regular rhythm.   Pulmonary:      Effort: Pulmonary effort is normal.   Abdominal:      General: There is no distension.      Palpations: Abdomen is soft.   Musculoskeletal:      Cervical back: Normal range of motion and neck supple.   Lymphadenopathy:      Cervical: No cervical adenopathy.   Neurological:      General: No focal deficit present.      Mental Status: She is alert.   Psychiatric:         Attention and Perception: Attention normal.         Speech: Speech normal.         Behavior: Behavior is cooperative.         Assessment/Plan   Diagnoses and all orders for this visit:  Combined  hyperlipidemia  Comments:  continue statin  stay active, follow low fat low chol diet  Essential hypertension, benign  Comments:  treated, controlled  Orders:  -     lisinopriL-hydrochlorothiazide 20-12.5 mg tablet; Take 1 tablet by mouth once daily.  Acute maxillary sinusitis, recurrence not specified  Comments:  disc risk benefits, add abx  Orders:  -     azithromycin (Zithromax) 250 mg tablet; Take 2 tablets (500 mg) by mouth once daily for 1 day, THEN 1 tablet (250 mg) once daily for 4 days.    Supportive care  Rtc 1 week if not better, sooner if worse

## 2024-01-02 DIAGNOSIS — M54.16 CHRONIC LUMBAR RADICULOPATHY: ICD-10-CM

## 2024-01-03 RX ORDER — GABAPENTIN 300 MG/1
300 CAPSULE ORAL 2 TIMES DAILY
Qty: 180 CAPSULE | Refills: 1 | Status: SHIPPED | OUTPATIENT
Start: 2024-01-03

## 2024-02-09 ENCOUNTER — OFFICE VISIT (OUTPATIENT)
Dept: PRIMARY CARE | Facility: CLINIC | Age: 84
End: 2024-02-09
Payer: MEDICARE

## 2024-02-09 VITALS
HEART RATE: 65 BPM | DIASTOLIC BLOOD PRESSURE: 78 MMHG | BODY MASS INDEX: 27.56 KG/M2 | SYSTOLIC BLOOD PRESSURE: 128 MMHG | WEIGHT: 146 LBS | OXYGEN SATURATION: 98 % | HEIGHT: 61 IN

## 2024-02-09 DIAGNOSIS — I10 ESSENTIAL HYPERTENSION, BENIGN: Primary | ICD-10-CM

## 2024-02-09 DIAGNOSIS — J01.00 ACUTE MAXILLARY SINUSITIS, RECURRENCE NOT SPECIFIED: ICD-10-CM

## 2024-02-09 LAB
POC RAPID INFLUENZA A: NEGATIVE
POC RAPID INFLUENZA B: NEGATIVE
POC SARS-COV-2 AG BINAX: NORMAL

## 2024-02-09 PROCEDURE — 1036F TOBACCO NON-USER: CPT | Performed by: FAMILY MEDICINE

## 2024-02-09 PROCEDURE — 1157F ADVNC CARE PLAN IN RCRD: CPT | Performed by: FAMILY MEDICINE

## 2024-02-09 PROCEDURE — 1160F RVW MEDS BY RX/DR IN RCRD: CPT | Performed by: FAMILY MEDICINE

## 2024-02-09 PROCEDURE — 3074F SYST BP LT 130 MM HG: CPT | Performed by: FAMILY MEDICINE

## 2024-02-09 PROCEDURE — 1126F AMNT PAIN NOTED NONE PRSNT: CPT | Performed by: FAMILY MEDICINE

## 2024-02-09 PROCEDURE — 1159F MED LIST DOCD IN RCRD: CPT | Performed by: FAMILY MEDICINE

## 2024-02-09 PROCEDURE — 87811 SARS-COV-2 COVID19 W/OPTIC: CPT | Performed by: FAMILY MEDICINE

## 2024-02-09 PROCEDURE — 87804 INFLUENZA ASSAY W/OPTIC: CPT | Performed by: FAMILY MEDICINE

## 2024-02-09 PROCEDURE — 3078F DIAST BP <80 MM HG: CPT | Performed by: FAMILY MEDICINE

## 2024-02-09 PROCEDURE — 99213 OFFICE O/P EST LOW 20 MIN: CPT | Performed by: FAMILY MEDICINE

## 2024-02-09 RX ORDER — PREDNISONE 10 MG/1
TABLET ORAL
Qty: 14 TABLET | Refills: 0 | Status: SHIPPED | OUTPATIENT
Start: 2024-02-09 | End: 2024-02-17

## 2024-02-09 RX ORDER — AMOXICILLIN 875 MG/1
875 TABLET, FILM COATED ORAL 2 TIMES DAILY
Qty: 20 TABLET | Refills: 0 | Status: SHIPPED | OUTPATIENT
Start: 2024-02-09 | End: 2024-02-19

## 2024-02-09 ASSESSMENT — ENCOUNTER SYMPTOMS
SINUS PRESSURE: 1
SINUS PAIN: 1
FATIGUE: 1
FEVER: 0
RHINORRHEA: 1

## 2024-02-09 ASSESSMENT — COLUMBIA-SUICIDE SEVERITY RATING SCALE - C-SSRS
2. HAVE YOU ACTUALLY HAD ANY THOUGHTS OF KILLING YOURSELF?: NO
6. HAVE YOU EVER DONE ANYTHING, STARTED TO DO ANYTHING, OR PREPARED TO DO ANYTHING TO END YOUR LIFE?: NO
1. IN THE PAST MONTH, HAVE YOU WISHED YOU WERE DEAD OR WISHED YOU COULD GO TO SLEEP AND NOT WAKE UP?: NO

## 2024-02-09 NOTE — PROGRESS NOTES
"Subjective   Patient ID: Kavita Butts is a 83 y.o. female who presents for Cough (With green phlegm ), Nasal Congestion, Headache (Front of her head), and Sore Throat (Feels like sand paper).    HPI   Started yesterday. Congestion, coughing. Pain actross frontal sinuses, maxillary sinuses, and chest. Has not been this sick in a long time.  previously had pneumonia and then he had congestion like she does. She said he's had green mucous from nose. She has been drinking water ok and tea. Steam in shower helps. Tried taking tylenol arthritis last night. Did not test for covid yet.     Loose bowels last night. Feels body fatigue, no muscle aches. No fever.     Review of Systems   Constitutional:  Positive for fatigue. Negative for fever.   HENT:  Positive for congestion, postnasal drip, rhinorrhea, sinus pressure and sinus pain.        Objective   /78   Pulse 65   Ht 1.549 m (5' 1\")   Wt 66.2 kg (146 lb)   SpO2 98%   BMI 27.59 kg/m²     Physical Exam    Assessment/Plan   Problem List Items Addressed This Visit             ICD-10-CM       Cardiac and Vasculature    Essential hypertension, benign - Primary I10     Other Visit Diagnoses         Codes    Acute maxillary sinusitis, recurrence not specified     J01.00    Relevant Medications    amoxicillin (Amoxil) 875 mg tablet    predniSONE (Deltasone) 10 mg tablet    Other Relevant Orders    POCT Influenza A/B manually resulted    POCT BinaxNOW Covid-19 Ag Card manually resulted               Plan  Maxillary sinusitis; COVID and influenza negative today. Given amoxicillin and prednisone.   Follow up in 1 week sooner if not better    Note authored by Mary Cowden, MS4  HTN controlled  Discussed risk benefits and medications as directed  RTC 1 week if not better sooner if worse  "

## 2024-02-21 DIAGNOSIS — E78.2 COMBINED HYPERLIPIDEMIA: ICD-10-CM

## 2024-02-21 RX ORDER — SIMVASTATIN 40 MG/1
40 TABLET, FILM COATED ORAL DAILY
Qty: 90 TABLET | Refills: 1 | Status: SHIPPED | OUTPATIENT
Start: 2024-02-21

## 2024-02-21 NOTE — TELEPHONE ENCOUNTER
MEDICATION REFILL    Pharmacy Name:        [facility]  Medication requested                   Simvastatin   40 mg    Dosage    1x daily  Quantity      90 days    Allergies     none given  Date of last visit    02/09/2024  Date of Next Appointment [  ]

## 2024-03-29 ENCOUNTER — TELEPHONE (OUTPATIENT)
Dept: PRIMARY CARE | Facility: CLINIC | Age: 84
End: 2024-03-29
Payer: MEDICARE

## 2024-03-29 DIAGNOSIS — M48.02 DEGENERATIVE CERVICAL SPINAL STENOSIS: ICD-10-CM

## 2024-03-29 RX ORDER — NABUMETONE 750 MG/1
TABLET, FILM COATED ORAL
Qty: 90 TABLET | Refills: 0 | Status: SHIPPED | OUTPATIENT
Start: 2024-03-29 | End: 2024-04-01 | Stop reason: SDUPTHER

## 2024-03-29 NOTE — TELEPHONE ENCOUNTER
Pharmacy: [facility]    Medication(s): nabumetone 750 mg 1/2 tab twice daily   Dose:^  Qty:   Last Office Visit: 11/28/2023  Next Office Visit: 04/24/2024

## 2024-04-01 ENCOUNTER — TELEPHONE (OUTPATIENT)
Dept: PRIMARY CARE | Facility: CLINIC | Age: 84
End: 2024-04-01
Payer: MEDICARE

## 2024-04-01 DIAGNOSIS — M48.02 DEGENERATIVE CERVICAL SPINAL STENOSIS: ICD-10-CM

## 2024-04-01 RX ORDER — NABUMETONE 750 MG/1
TABLET, FILM COATED ORAL
Qty: 90 TABLET | Refills: 0 | Status: SHIPPED | OUTPATIENT
Start: 2024-04-01

## 2024-04-01 NOTE — TELEPHONE ENCOUNTER
Pt states [facility] was sent her refill for nabumetone 750 mg table, 1/2 tablet twice a day.  However, her pharmacy changed at the beginning of the year to [facility]/. Can the Rx be sent to [facility]/ for her?

## 2024-04-24 ENCOUNTER — OFFICE VISIT (OUTPATIENT)
Dept: PRIMARY CARE | Facility: CLINIC | Age: 84
End: 2024-04-24
Payer: MEDICARE

## 2024-04-24 ENCOUNTER — LAB (OUTPATIENT)
Dept: LAB | Facility: LAB | Age: 84
End: 2024-04-24
Payer: MEDICARE

## 2024-04-24 VITALS
DIASTOLIC BLOOD PRESSURE: 75 MMHG | OXYGEN SATURATION: 98 % | HEART RATE: 72 BPM | HEIGHT: 61 IN | WEIGHT: 150 LBS | SYSTOLIC BLOOD PRESSURE: 135 MMHG | BODY MASS INDEX: 28.32 KG/M2

## 2024-04-24 DIAGNOSIS — Z79.899 MEDICATION MANAGEMENT: ICD-10-CM

## 2024-04-24 DIAGNOSIS — Z00.00 ROUTINE GENERAL MEDICAL EXAMINATION AT HEALTH CARE FACILITY: Primary | ICD-10-CM

## 2024-04-24 DIAGNOSIS — I10 ESSENTIAL HYPERTENSION, BENIGN: ICD-10-CM

## 2024-04-24 DIAGNOSIS — F41.1 GENERALIZED ANXIETY DISORDER: ICD-10-CM

## 2024-04-24 DIAGNOSIS — E78.2 COMBINED HYPERLIPIDEMIA: ICD-10-CM

## 2024-04-24 LAB
ANION GAP SERPL CALC-SCNC: 12 MMOL/L (ref 10–20)
BUN SERPL-MCNC: 16 MG/DL (ref 6–23)
CALCIUM SERPL-MCNC: 9.9 MG/DL (ref 8.6–10.3)
CHLORIDE SERPL-SCNC: 97 MMOL/L (ref 98–107)
CO2 SERPL-SCNC: 28 MMOL/L (ref 21–32)
CREAT SERPL-MCNC: 0.7 MG/DL (ref 0.5–1.05)
EGFRCR SERPLBLD CKD-EPI 2021: 87 ML/MIN/1.73M*2
GLUCOSE SERPL-MCNC: 97 MG/DL (ref 74–99)
POTASSIUM SERPL-SCNC: 3.8 MMOL/L (ref 3.5–5.3)
SODIUM SERPL-SCNC: 133 MMOL/L (ref 136–145)

## 2024-04-24 PROCEDURE — 80048 BASIC METABOLIC PNL TOTAL CA: CPT

## 2024-04-24 PROCEDURE — 1170F FXNL STATUS ASSESSED: CPT | Performed by: FAMILY MEDICINE

## 2024-04-24 PROCEDURE — 1157F ADVNC CARE PLAN IN RCRD: CPT | Performed by: FAMILY MEDICINE

## 2024-04-24 PROCEDURE — 1159F MED LIST DOCD IN RCRD: CPT | Performed by: FAMILY MEDICINE

## 2024-04-24 PROCEDURE — 99214 OFFICE O/P EST MOD 30 MIN: CPT | Performed by: FAMILY MEDICINE

## 2024-04-24 PROCEDURE — 3078F DIAST BP <80 MM HG: CPT | Performed by: FAMILY MEDICINE

## 2024-04-24 PROCEDURE — 1036F TOBACCO NON-USER: CPT | Performed by: FAMILY MEDICINE

## 2024-04-24 PROCEDURE — G0439 PPPS, SUBSEQ VISIT: HCPCS | Performed by: FAMILY MEDICINE

## 2024-04-24 PROCEDURE — 1160F RVW MEDS BY RX/DR IN RCRD: CPT | Performed by: FAMILY MEDICINE

## 2024-04-24 PROCEDURE — 3075F SYST BP GE 130 - 139MM HG: CPT | Performed by: FAMILY MEDICINE

## 2024-04-24 PROCEDURE — 36415 COLL VENOUS BLD VENIPUNCTURE: CPT

## 2024-04-24 RX ORDER — LISINOPRIL AND HYDROCHLOROTHIAZIDE 12.5; 2 MG/1; MG/1
1 TABLET ORAL DAILY
Qty: 90 TABLET | Refills: 1 | Status: SHIPPED | OUTPATIENT
Start: 2024-04-24 | End: 2024-10-21

## 2024-04-24 RX ORDER — LORAZEPAM 1 MG/1
TABLET ORAL
Status: CANCELLED | OUTPATIENT
Start: 2024-04-24

## 2024-04-24 ASSESSMENT — ACTIVITIES OF DAILY LIVING (ADL)
GROCERY_SHOPPING: INDEPENDENT
BATHING: INDEPENDENT
DRESSING: INDEPENDENT
MANAGING_FINANCES: INDEPENDENT
TAKING_MEDICATION: INDEPENDENT
DOING_HOUSEWORK: INDEPENDENT

## 2024-04-24 ASSESSMENT — ENCOUNTER SYMPTOMS
NAUSEA: 0
UNEXPECTED WEIGHT CHANGE: 0
APPETITE CHANGE: 0

## 2024-04-24 ASSESSMENT — PATIENT HEALTH QUESTIONNAIRE - PHQ9
1. LITTLE INTEREST OR PLEASURE IN DOING THINGS: NOT AT ALL
2. FEELING DOWN, DEPRESSED OR HOPELESS: MORE THAN HALF THE DAYS
SUM OF ALL RESPONSES TO PHQ9 QUESTIONS 1 AND 2: 2
10. IF YOU CHECKED OFF ANY PROBLEMS, HOW DIFFICULT HAVE THESE PROBLEMS MADE IT FOR YOU TO DO YOUR WORK, TAKE CARE OF THINGS AT HOME, OR GET ALONG WITH OTHER PEOPLE: SOMEWHAT DIFFICULT

## 2024-04-24 NOTE — PROGRESS NOTES
"Subjective   Patient ID: Kavita Butts is a 83 y.o. female who presents for Medicare Annual Wellness Visit Subsequent.    HPI   Patient has hx of stable hypertension, hyperlipidemia.  Pt denies chest pain, shortness of breath and edema.  Patient's current treatment as listed in Rx.  Patient is compliant with treatment and complains of no side effects associated treatment.    Review of Systems   Constitutional:  Negative for appetite change and unexpected weight change.   Eyes:  Negative for visual disturbance.   Gastrointestinal:  Negative for nausea.       Objective   /75   Pulse 72   Ht 1.549 m (5' 1\")   Wt 68 kg (150 lb)   SpO2 98%   BMI 28.34 kg/m²     Physical Exam  HENT:      Head: Normocephalic and atraumatic.      Nose: Nose normal.      Mouth/Throat:      Mouth: Mucous membranes are moist.      Pharynx: No oropharyngeal exudate.   Eyes:      Extraocular Movements: Extraocular movements intact.      Conjunctiva/sclera: Conjunctivae normal.      Pupils: Pupils are equal, round, and reactive to light.   Cardiovascular:      Rate and Rhythm: Normal rate and regular rhythm.   Pulmonary:      Effort: Pulmonary effort is normal.   Abdominal:      General: There is no distension.      Palpations: Abdomen is soft.   Musculoskeletal:      Cervical back: Normal range of motion and neck supple.   Lymphadenopathy:      Cervical: No cervical adenopathy.   Neurological:      General: No focal deficit present.      Mental Status: She is alert.   Psychiatric:         Attention and Perception: Attention normal.         Speech: Speech normal.         Behavior: Behavior is cooperative.         Assessment/Plan   Diagnoses and all orders for this visit:  Routine general medical examination at health care facility  Comments:   PRIYANKA discussed  Essential hypertension, benign  Comments:  treated, controlled  Orders:  -     lisinopriL-hydrochlorothiazide 20-12.5 mg tablet; Take 1 tablet by mouth once daily.  -     Basic " Metabolic Panel; Future  Combined hyperlipidemia  Comments:  continue statin  Generalized anxiety disorder  -     Opiate/Opioid/Benzo Prescription Compliance  Medication management  -     Opiate/Opioid/Benzo Prescription Compliance    I have personally reviewed the OARRS report for this patient. I have considered the risks of abuse, dependence, addiction and diversion.   Patient unable to give urine today  We will hold off on lorazepam prescription  Recheck 6 months sooner if any issues arise

## 2024-04-25 ENCOUNTER — TELEPHONE (OUTPATIENT)
Dept: PRIMARY CARE | Facility: CLINIC | Age: 84
End: 2024-04-25
Payer: MEDICARE

## 2024-04-25 LAB
AMPHETAMINES UR QL SCN: NORMAL
BARBITURATES UR QL SCN: NORMAL
BZE UR QL SCN: NORMAL
CANNABINOIDS UR QL SCN: NORMAL
CREAT UR-MCNC: 49.8 MG/DL (ref 20–320)
PCP UR QL SCN: NORMAL

## 2024-04-25 PROCEDURE — 82570 ASSAY OF URINE CREATININE: CPT

## 2024-04-25 PROCEDURE — 80307 DRUG TEST PRSMV CHEM ANLYZR: CPT

## 2024-04-25 PROCEDURE — 80365 DRUG SCREENING OXYCODONE: CPT

## 2024-04-25 PROCEDURE — 80346 BENZODIAZEPINES1-12: CPT

## 2024-04-25 PROCEDURE — 80373 DRUG SCREENING TRAMADOL: CPT

## 2024-04-25 PROCEDURE — 80368 SEDATIVE HYPNOTICS: CPT

## 2024-04-25 PROCEDURE — 80358 DRUG SCREENING METHADONE: CPT

## 2024-04-25 PROCEDURE — 80361 OPIATES 1 OR MORE: CPT

## 2024-04-25 PROCEDURE — 80354 DRUG SCREENING FENTANYL: CPT

## 2024-04-25 NOTE — TELEPHONE ENCOUNTER
Result Communication    Resulted Orders   Basic Metabolic Panel   Result Value Ref Range    Glucose 97 74 - 99 mg/dL    Sodium 133 (L) 136 - 145 mmol/L    Potassium 3.8 3.5 - 5.3 mmol/L    Chloride 97 (L) 98 - 107 mmol/L    Bicarbonate 28 21 - 32 mmol/L    Anion Gap 12 10 - 20 mmol/L    Urea Nitrogen 16 6 - 23 mg/dL    Creatinine 0.70 0.50 - 1.05 mg/dL    eGFR 87 >60 mL/min/1.73m*2      Comment:      Calculations of estimated GFR are performed using the 2021 CKD-EPI Study Refit equation without the race variable for the IDMS-Traceable creatinine methods.  https://jasn.asnjournals.org/content/early/2021/09/22/ASN.0088368475    Calcium 9.9 8.6 - 10.3 mg/dL       12:51 PM      Results were successfully communicated with the patient and they acknowledged their understanding.

## 2024-05-16 ENCOUNTER — APPOINTMENT (OUTPATIENT)
Dept: PRIMARY CARE | Facility: CLINIC | Age: 84
End: 2024-05-16
Payer: MEDICARE

## 2024-05-28 NOTE — PROGRESS NOTES
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 She has a hx of vaginal vault prolapse that is now resolved s/p colpocleisis, MO, perineorrhaphy, and cystoscopy on 1/14/2020 with Dr. Alexander. Comorbidities include: HTN and lumbar spinal stenosis.     Diagnoses:  #1 Vaginal vault prolapse (resolved)  #2 Recurrent UTI  #3 Vaginal atrophy   #4 Breast cancer screening    Plan:  1. Vaginal vault prolapse (resolved)   - 1/14/2020 surgery: Colpocleisis, MO, perineorrhaphy, and cystoscopy.   - No return of POP per patient symptoms or based on physical exam today.     2. Earl, vaginal atrophy   - No recent UTI within the last 12 months.     3. Breast cancer screening  - Last mammogram on 8/28/2023 that returned BI-RADS category 1 (negative).   - Bilateral mammogram with tomosynthesis requisition sent today and this order expires by 7/30/2025.     Follow up in 1 year with Dr. Alexander for annual pelvic exam.     Scribe Attestation  By signing my name below, I, Hong Fine, attest that this documentation has been prepared under the direction and in the presence of Sanam Alexander MD on 05/30/2024 at 11:34 AM.     Agree with above. I Dr. Alexander, personally performed the services described in the documentation which was scribed virtually and confirm it is both complete and accurate.  Sanam Alexander MD        Problem List Items Addressed This Visit    None          I spent a total of eConsult Time: 20 minutes in face to face and non face to face time.        Snaam Alexander MD    HISTORY OF PRESENT ILLNESS:   83 year old following up for an annual pelvic exam visit.     Record Review:  - Last visit 5/2023  Assessment:   82 year old female being assessed for Earl's, lichen sclerosus, cystocele     Diagnoses:   #1 Earl's  #2 Lichen sclerosus  #3 Cystocele      Plan:   1. Earl's  - UA today all negative except  small lueks    - Reassured pt, no concerns at this time   - All questions answered      2. Cystocele   - S/P 01/14/2020: colpocleisis, [place], perineorrhaphy and cystoscopy   - Well-healed colpocleisis, no recurrence of prolapse noted    - Last UTI 4/2023 E. Coli. Using tv estrogen?    Urine Culture History:   - 4/17/2023: Escherichia coli >100,000 CFU/ML. Resistant to Ampicillin and Ciprofloxacin.     Prolapse Symptoms:  - Denies any return of POP symptoms or a vaginal bulge.      Urinary Symptoms:   - No recent UTI within the last 12 months.   - She denies any urinary frequency, urgency, or incontinence (UUI or BEATRICE).   - The patient reports limiting her fluid intake 2 hours before bed to reduce nocturia and this is working well for her.     Health Screenings:   - Last mammogram on 8/28/2023 that returned BI-RADS category 1 (negative).        Past Medical History:    Past Medical History:   Diagnosis Date    Osteoarthritis of knee, unspecified     Degenerative joint disease of knee    Other conditions influencing health status     History Of ___ Previous Pregnancies    Other intervertebral disc degeneration, lumbar region     Degenerative disc disease, lumbar    Other intervertebral disc displacement, lumbar region     Lumbar herniated disc    Pain in unspecified limb     Limb pain    Personal history of other diseases of the circulatory system     History of hypertension    Personal history of other diseases of the musculoskeletal system and connective tissue     History of arthritis    Personal history of other diseases of the musculoskeletal system and connective tissue     History of low back pain    Personal history of other diseases of the nervous system and sense organs     History of hearing loss    Personal history of other diseases of the nervous system and sense organs     History of carpal tunnel syndrome    Personal history of other endocrine, nutritional and metabolic disease     History of obesity     Personal history of other endocrine, nutritional and metabolic disease     History of hypercholesterolemia    Personal history of other infectious and parasitic diseases     History of hepatitis    Personal history of other mental and behavioral disorders     History of depression    Personal history of other specified conditions     History of fibrocystic disease of breast    Personal history of other specified conditions     History of fatigue    Postlaminectomy syndrome, not elsewhere classified     Postlaminectomy syndrome, lumbar    Radiculopathy, cervical region     Cervical radiculopathy    Radiculopathy, lumbar region     Lumbar radiculopathy    Spinal stenosis, site unspecified     Spinal stenosis    UTI (urinary tract infection) 04/10/2023        Past Surgical History:     Past Surgical History:   Procedure Laterality Date    APPENDECTOMY  02/02/2017    Appendectomy    CARPAL TUNNEL RELEASE  09/23/2013    Neuroplasty Decompression Median Nerve At Carpal Tunnel    CARPAL TUNNEL RELEASE  09/23/2013    Neuroplasty Decompression Median Nerve At Carpal Tunnel    CATARACT EXTRACTION  09/23/2013    Cataract Surgery    CHOLECYSTECTOMY  02/02/2017    Cholecystectomy Laparoscopic    COLONOSCOPY  02/02/2017    Colonoscopy    HYSTERECTOMY  09/23/2013    Hysterectomy    KNEE SURGERY  07/20/2017    Knee Surgery    OTHER SURGICAL HISTORY  09/23/2013    Transpedicular Spinal Decompression Lumbar Segment    OTHER SURGICAL HISTORY  02/02/2017    Breast Surgery Puncture Aspiration Of Cyst    OTHER SURGICAL HISTORY  02/02/2017    Wrist Excision Of Ganglion       Medications:     Prior to Admission medications    Medication Sig Start Date End Date Taking? Authorizing Provider   calcium carbonate-vitamin D3 600 mg-20 mcg (800 unit) tablet Take 1 tablet by mouth once daily.    Historical Provider, MD   docusate sodium (Colace) 100 mg capsule 100 mg 1 caps, ORAL, BID, Date: 01/20/2023 10:52:00 EST, Capsule 1/20/23   Historical  Provider, MD   gabapentin (Neurontin) 300 mg capsule Take 1 capsule (300 mg) by mouth 2 times a day. 1/3/24   Dalton Norirs MD   glucos sul 2KCl-msm-chond-C-Mn (Glucosamine Chondroitin) 550-30-1 mg capsule Take by mouth once daily.    Historical Provider, MD   lisinopriL-hydrochlorothiazide 20-12.5 mg tablet Take 1 tablet by mouth once daily. 4/24/24 10/21/24  Dalton Norris MD   LORazepam (Ativan) 1 mg tablet take 1/2 to 1 tablet by mouth twice a day if needed for anxiety    Historical Provider, MD   multivit-min-folic acid-biotin (Hair,Skin and Nails,FA-biotin,) 133.3 mcg- 1,666.7 mcg capsule Take 1 capsule by mouth once daily. 2/24/22   Historical Provider, MD   nabumetone (Relafen) 750 mg tablet TAKE ONE-HALF (1/2) TABLET TWICE A DAY AS NEEDED 4/1/24   Dalton Norris MD   simvastatin (Zocor) 40 mg tablet Take 1 tablet (40 mg) by mouth once daily. 2/21/24   Dalton Norris MD        ROS  Review of Systems   Constitutional: Negative.    HENT: Negative.     Eyes: Negative.    Respiratory: Negative.     Cardiovascular: Negative.    Gastrointestinal: Negative.    Endocrine: Negative.    Genitourinary: Negative.    Musculoskeletal: Negative.    Neurological: Negative.    Psychiatric/Behavioral: Negative.          POC Blood, Urine   Date Value Ref Range Status   04/17/2023 NEGATIVE NEGATIVE Final     Poc Nitrite, Urine   Date Value Ref Range Status   04/17/2023 POSITIVE (A) NEGATIVE Final     POC Urobilinogen, Urine   Date Value Ref Range Status   04/17/2023 0.2 0.2, 1.0 EU/DL Final     POC Leukocytes, Urine   Date Value Ref Range Status   04/17/2023 MODERATE (2+) (A) NEGATIVE Final         PHYSICAL EXAM:    There were no vitals taken for this visit.    Declines chaperone for physical exam.      Well developed, well nourished, in no apparent distress.   Neurologic/Psychiatric:  Awake, Alert and Oriented times 3.  Affect normal.     GENITAL/URINARY:     External Genitalia:  The patient has normal appearing  external genitalia, normal skenes and bartholins glands, and a normal hair distribution.  Her vulva is without lesions, erythema or discharge.  It is non-tender with appropriate sensation.     Urethral Meatus:  Size normal, Location normal, Lesions absent, Prolapse absent.     Urethra:  Fullness absent, Masses absent.     Bladder:  Fullness absent, Masses absent, Tenderness absent.     Vagina:  General appearance normal, Discharge absent, Lesions absent. Well-healed colpocleisis. There is a minor posterior wall defect with posterior measuring -1, posthysterectomy apex. Moderate vaginal atrophy.        Anus/Perineum:  Lesions absent and masses absent. Normal perineum and anus.     Stress urinary incontinence not demonstrable.         POP-Q:  Stage: 2  Position: supine    Aa: -3       Ba:  C: -3   Gh:  Pb:  TVL: 3         Ap: -1 Bp: -1 D: X           Rectum: No rectovaginal masses, normal resting tone.     Data and DIAGNOSTIC STUDIES REVIEWED   No results found.   Lab Results   Component Value Date    URINECULTURE Escherichia coli (A) 04/17/2023      Lab Results   Component Value Date    GLUCOSE 97 04/24/2024    CALCIUM 9.9 04/24/2024     (L) 04/24/2024    K 3.8 04/24/2024    CO2 28 04/24/2024    CL 97 (L) 04/24/2024    BUN 16 04/24/2024    CREATININE 0.70 04/24/2024     Lab Results   Component Value Date    WBC 5.2 11/17/2023    HGB 12.2 11/17/2023    HCT 37.8 11/17/2023     (H) 11/17/2023     11/17/2023          Sanam Alexander MD

## 2024-05-30 ENCOUNTER — OFFICE VISIT (OUTPATIENT)
Dept: OBSTETRICS AND GYNECOLOGY | Facility: CLINIC | Age: 84
End: 2024-05-30
Payer: MEDICARE

## 2024-05-30 VITALS — SYSTOLIC BLOOD PRESSURE: 169 MMHG | DIASTOLIC BLOOD PRESSURE: 80 MMHG | HEART RATE: 83 BPM

## 2024-05-30 DIAGNOSIS — Z12.31 VISIT FOR SCREENING MAMMOGRAM: Primary | ICD-10-CM

## 2024-05-30 PROCEDURE — 1159F MED LIST DOCD IN RCRD: CPT | Performed by: OBSTETRICS & GYNECOLOGY

## 2024-05-30 PROCEDURE — 1157F ADVNC CARE PLAN IN RCRD: CPT | Performed by: OBSTETRICS & GYNECOLOGY

## 2024-05-30 PROCEDURE — 1160F RVW MEDS BY RX/DR IN RCRD: CPT | Performed by: OBSTETRICS & GYNECOLOGY

## 2024-05-30 PROCEDURE — 99213 OFFICE O/P EST LOW 20 MIN: CPT | Performed by: OBSTETRICS & GYNECOLOGY

## 2024-05-30 PROCEDURE — 3077F SYST BP >= 140 MM HG: CPT | Performed by: OBSTETRICS & GYNECOLOGY

## 2024-05-30 PROCEDURE — 1126F AMNT PAIN NOTED NONE PRSNT: CPT | Performed by: OBSTETRICS & GYNECOLOGY

## 2024-05-30 PROCEDURE — 3079F DIAST BP 80-89 MM HG: CPT | Performed by: OBSTETRICS & GYNECOLOGY

## 2024-05-30 ASSESSMENT — ENCOUNTER SYMPTOMS
MUSCULOSKELETAL NEGATIVE: 1
RESPIRATORY NEGATIVE: 1
PSYCHIATRIC NEGATIVE: 1
EYES NEGATIVE: 1
NEUROLOGICAL NEGATIVE: 1
CARDIOVASCULAR NEGATIVE: 1
ENDOCRINE NEGATIVE: 1
CONSTITUTIONAL NEGATIVE: 1
GASTROINTESTINAL NEGATIVE: 1

## 2024-05-30 ASSESSMENT — PAIN SCALES - GENERAL: PAINLEVEL: 0-NO PAIN

## 2024-06-17 ENCOUNTER — TELEPHONE (OUTPATIENT)
Dept: PRIMARY CARE | Facility: CLINIC | Age: 84
End: 2024-06-17
Payer: MEDICARE

## 2024-06-17 DIAGNOSIS — M54.16 CHRONIC LUMBAR RADICULOPATHY: ICD-10-CM

## 2024-06-17 DIAGNOSIS — F41.1 GENERALIZED ANXIETY DISORDER: ICD-10-CM

## 2024-06-17 RX ORDER — GABAPENTIN 300 MG/1
300 CAPSULE ORAL 2 TIMES DAILY
Qty: 180 CAPSULE | Refills: 1 | Status: SHIPPED | OUTPATIENT
Start: 2024-06-17

## 2024-06-17 RX ORDER — LORAZEPAM 1 MG/1
TABLET ORAL
Qty: 30 TABLET | Refills: 0 | Status: SHIPPED | OUTPATIENT
Start: 2024-06-17

## 2024-06-17 NOTE — TELEPHONE ENCOUNTER
Rx Refill Request Telephone Encounter    Name:  Kavita Butts  :  [id number]  Medication Name:    gabapentin (Neurontin) 300 mg capsule Take 1 capsule (300 mg) by mouth 2 times a day.  - 90 day    LORazepam (Ativan) 1 mg tablet     Specific Pharmacy location:  [facility]  Date of last appointment:  2024  Date of next appointment:  10/22/24  Best number to reach patient:

## 2024-06-26 DIAGNOSIS — M48.02 DEGENERATIVE CERVICAL SPINAL STENOSIS: ICD-10-CM

## 2024-06-26 DIAGNOSIS — E78.2 COMBINED HYPERLIPIDEMIA: ICD-10-CM

## 2024-06-27 RX ORDER — SIMVASTATIN 40 MG/1
40 TABLET, FILM COATED ORAL DAILY
Qty: 90 TABLET | Refills: 1 | Status: SHIPPED | OUTPATIENT
Start: 2024-06-27

## 2024-06-27 RX ORDER — NABUMETONE 750 MG/1
TABLET, FILM COATED ORAL
Qty: 90 TABLET | Refills: 1 | Status: SHIPPED | OUTPATIENT
Start: 2024-06-27

## 2024-08-30 ENCOUNTER — HOSPITAL ENCOUNTER (OUTPATIENT)
Dept: RADIOLOGY | Facility: HOSPITAL | Age: 84
Discharge: HOME | End: 2024-08-30
Payer: MEDICARE

## 2024-08-30 VITALS — WEIGHT: 146 LBS | BODY MASS INDEX: 27.56 KG/M2 | HEIGHT: 61 IN

## 2024-08-30 DIAGNOSIS — Z12.31 VISIT FOR SCREENING MAMMOGRAM: ICD-10-CM

## 2024-08-30 PROCEDURE — 77067 SCR MAMMO BI INCL CAD: CPT | Performed by: RADIOLOGY

## 2024-08-30 PROCEDURE — 77067 SCR MAMMO BI INCL CAD: CPT

## 2024-08-30 PROCEDURE — 77063 BREAST TOMOSYNTHESIS BI: CPT | Performed by: RADIOLOGY

## 2024-09-03 ENCOUNTER — TELEPHONE (OUTPATIENT)
Dept: OBSTETRICS AND GYNECOLOGY | Facility: CLINIC | Age: 84
End: 2024-09-03
Payer: MEDICARE

## 2024-09-03 ENCOUNTER — DOCUMENTATION (OUTPATIENT)
Dept: OBSTETRICS AND GYNECOLOGY | Facility: CLINIC | Age: 84
End: 2024-09-03
Payer: MEDICARE

## 2024-09-03 ENCOUNTER — TELEPHONE (OUTPATIENT)
Dept: PRIMARY CARE | Facility: CLINIC | Age: 84
End: 2024-09-03
Payer: MEDICARE

## 2024-09-03 DIAGNOSIS — R92.8 ABNORMAL MAMMOGRAM: ICD-10-CM

## 2024-09-03 NOTE — TELEPHONE ENCOUNTER
Returned patient call regarding mammogram prescription, received voicemail. Left message for patient to call the office.

## 2024-09-03 NOTE — PROGRESS NOTES
Patient call regarding mammogram rx, patient reported abnormal finding on last mammogram and need a Rx.Rx send to lab.

## 2024-09-05 ENCOUNTER — HOSPITAL ENCOUNTER (OUTPATIENT)
Dept: RADIOLOGY | Facility: HOSPITAL | Age: 84
Discharge: HOME | End: 2024-09-05
Payer: MEDICARE

## 2024-09-05 DIAGNOSIS — R92.8 ABNORMAL MAMMOGRAM: ICD-10-CM

## 2024-09-05 PROCEDURE — 77061 BREAST TOMOSYNTHESIS UNI: CPT | Mod: LT

## 2024-10-16 DIAGNOSIS — I10 ESSENTIAL HYPERTENSION, BENIGN: ICD-10-CM

## 2024-10-16 RX ORDER — LISINOPRIL AND HYDROCHLOROTHIAZIDE 12.5; 2 MG/1; MG/1
1 TABLET ORAL DAILY
Qty: 90 TABLET | Refills: 0 | Status: SHIPPED | OUTPATIENT
Start: 2024-10-16

## 2024-10-22 ENCOUNTER — APPOINTMENT (OUTPATIENT)
Dept: PRIMARY CARE | Facility: CLINIC | Age: 84
End: 2024-10-22
Payer: MEDICARE

## 2024-10-22 ENCOUNTER — LAB (OUTPATIENT)
Dept: LAB | Facility: LAB | Age: 84
End: 2024-10-22
Payer: MEDICARE

## 2024-10-22 VITALS
HEART RATE: 63 BPM | BODY MASS INDEX: 27.89 KG/M2 | WEIGHT: 147.6 LBS | DIASTOLIC BLOOD PRESSURE: 68 MMHG | SYSTOLIC BLOOD PRESSURE: 128 MMHG | OXYGEN SATURATION: 98 %

## 2024-10-22 DIAGNOSIS — I10 ESSENTIAL HYPERTENSION, BENIGN: ICD-10-CM

## 2024-10-22 DIAGNOSIS — F41.1 GENERALIZED ANXIETY DISORDER: ICD-10-CM

## 2024-10-22 DIAGNOSIS — I10 ESSENTIAL HYPERTENSION, BENIGN: Primary | ICD-10-CM

## 2024-10-22 DIAGNOSIS — E78.2 COMBINED HYPERLIPIDEMIA: ICD-10-CM

## 2024-10-22 LAB
ALBUMIN SERPL BCP-MCNC: 4 G/DL (ref 3.4–5)
ALP SERPL-CCNC: 70 U/L (ref 33–136)
ALT SERPL W P-5'-P-CCNC: 18 U/L (ref 7–45)
ANION GAP SERPL CALC-SCNC: 13 MMOL/L (ref 10–20)
AST SERPL W P-5'-P-CCNC: 23 U/L (ref 9–39)
BASOPHILS # BLD AUTO: 0.03 X10*3/UL (ref 0–0.1)
BASOPHILS NFR BLD AUTO: 0.6 %
BILIRUB SERPL-MCNC: 0.4 MG/DL (ref 0–1.2)
BUN SERPL-MCNC: 16 MG/DL (ref 6–23)
CALCIUM SERPL-MCNC: 9.8 MG/DL (ref 8.6–10.3)
CHLORIDE SERPL-SCNC: 96 MMOL/L (ref 98–107)
CHOLEST SERPL-MCNC: 120 MG/DL (ref 0–199)
CHOLESTEROL/HDL RATIO: 2.3
CO2 SERPL-SCNC: 25 MMOL/L (ref 21–32)
CREAT SERPL-MCNC: 0.75 MG/DL (ref 0.5–1.05)
EGFRCR SERPLBLD CKD-EPI 2021: 80 ML/MIN/1.73M*2
EOSINOPHIL # BLD AUTO: 0.29 X10*3/UL (ref 0–0.4)
EOSINOPHIL NFR BLD AUTO: 5.5 %
ERYTHROCYTE [DISTWIDTH] IN BLOOD BY AUTOMATED COUNT: 12.9 % (ref 11.5–14.5)
GLUCOSE SERPL-MCNC: 84 MG/DL (ref 74–99)
HCT VFR BLD AUTO: 41.4 % (ref 36–46)
HDLC SERPL-MCNC: 51.9 MG/DL
HGB BLD-MCNC: 13 G/DL (ref 12–16)
IMM GRANULOCYTES # BLD AUTO: 0.02 X10*3/UL (ref 0–0.5)
IMM GRANULOCYTES NFR BLD AUTO: 0.4 % (ref 0–0.9)
LYMPHOCYTES # BLD AUTO: 1.16 X10*3/UL (ref 0.8–3)
LYMPHOCYTES NFR BLD AUTO: 21.9 %
MCH RBC QN AUTO: 33.2 PG (ref 26–34)
MCHC RBC AUTO-ENTMCNC: 31.4 G/DL (ref 32–36)
MCV RBC AUTO: 106 FL (ref 80–100)
MONOCYTES # BLD AUTO: 0.37 X10*3/UL (ref 0.05–0.8)
MONOCYTES NFR BLD AUTO: 7 %
NEUTROPHILS # BLD AUTO: 3.42 X10*3/UL (ref 1.6–5.5)
NEUTROPHILS NFR BLD AUTO: 64.6 %
NON-HDL CHOLESTEROL: 68 MG/DL (ref 0–149)
NRBC BLD-RTO: 0.4 /100 WBCS (ref 0–0)
PLATELET # BLD AUTO: 226 X10*3/UL (ref 150–450)
POTASSIUM SERPL-SCNC: 4 MMOL/L (ref 3.5–5.3)
PROT SERPL-MCNC: 8.6 G/DL (ref 6.4–8.2)
RBC # BLD AUTO: 3.92 X10*6/UL (ref 4–5.2)
SODIUM SERPL-SCNC: 130 MMOL/L (ref 136–145)
WBC # BLD AUTO: 5.3 X10*3/UL (ref 4.4–11.3)

## 2024-10-22 PROCEDURE — 36415 COLL VENOUS BLD VENIPUNCTURE: CPT

## 2024-10-22 PROCEDURE — 99214 OFFICE O/P EST MOD 30 MIN: CPT | Performed by: FAMILY MEDICINE

## 2024-10-22 PROCEDURE — 80053 COMPREHEN METABOLIC PANEL: CPT

## 2024-10-22 PROCEDURE — 82465 ASSAY BLD/SERUM CHOLESTEROL: CPT

## 2024-10-22 PROCEDURE — 83718 ASSAY OF LIPOPROTEIN: CPT

## 2024-10-22 PROCEDURE — 85025 COMPLETE CBC W/AUTO DIFF WBC: CPT

## 2024-10-22 ASSESSMENT — ENCOUNTER SYMPTOMS
NAUSEA: 0
UNEXPECTED WEIGHT CHANGE: 0
APPETITE CHANGE: 0

## 2024-10-22 NOTE — PROGRESS NOTES
Subjective   Patient ID: Kavita Butts is a 84 y.o. female who presents for Follow-up (6 month, pt is recovering still from husbands death but doing well. Waking up in the middle of the night and is having a very hard time going back to sleep.  ).    HPI   Patient has hx of stable hypertension, hyperlipidemia.  Pt denies chest pain, shortness of breath and edema.  Patient's current treatment as listed in Rx.  Patient is compliant with treatment and complains of no side effects associated treatment.    Review of Systems   Constitutional:  Negative for appetite change and unexpected weight change.   Eyes:  Negative for visual disturbance.   Gastrointestinal:  Negative for nausea.       Objective   /68   Pulse 63   Wt 67 kg (147 lb 9.6 oz)   SpO2 98%   BMI 27.89 kg/m²     Physical Exam  HENT:      Head: Normocephalic and atraumatic.      Nose: Nose normal.      Mouth/Throat:      Mouth: Mucous membranes are moist.      Pharynx: No oropharyngeal exudate.   Eyes:      Extraocular Movements: Extraocular movements intact.      Conjunctiva/sclera: Conjunctivae normal.      Pupils: Pupils are equal, round, and reactive to light.   Cardiovascular:      Rate and Rhythm: Normal rate and regular rhythm.   Pulmonary:      Effort: Pulmonary effort is normal.      Breath sounds: Normal breath sounds.   Abdominal:      General: There is no distension.      Palpations: Abdomen is soft.   Musculoskeletal:      Cervical back: Normal range of motion and neck supple.   Lymphadenopathy:      Cervical: No cervical adenopathy.   Neurological:      General: No focal deficit present.      Mental Status: She is alert.   Psychiatric:         Attention and Perception: Attention normal.         Speech: Speech normal.         Behavior: Behavior is cooperative.         Assessment/Plan   Problem List Items Addressed This Visit             ICD-10-CM    Combined hyperlipidemia E78.2     Continue statin  Follow Mediterranean diet and stay  active         Relevant Orders    CBC and Auto Differential    Comprehensive Metabolic Panel    Lipid Panel Non-Fasting    Essential hypertension, benign - Primary I10     Treated and controlled         Relevant Orders    CBC and Auto Differential    Comprehensive Metabolic Panel    Lipid Panel Non-Fasting    Generalized anxiety disorder F41.1     Controlled         Relevant Orders    CBC and Auto Differential    Comprehensive Metabolic Panel    Lipid Panel Non-Fasting     Patient appears to be doing well following the loss of her  earlier this year after 57 years of marriage to Parkinson's disease  HM LM discussed  Follow-up 6 months sooner if any issues arise

## 2024-12-09 DIAGNOSIS — M54.16 CHRONIC LUMBAR RADICULOPATHY: ICD-10-CM

## 2024-12-09 RX ORDER — GABAPENTIN 300 MG/1
300 CAPSULE ORAL 2 TIMES DAILY
Qty: 180 CAPSULE | Refills: 0 | Status: SHIPPED | OUTPATIENT
Start: 2024-12-09

## 2025-01-11 DIAGNOSIS — I10 ESSENTIAL HYPERTENSION, BENIGN: ICD-10-CM

## 2025-01-12 RX ORDER — LISINOPRIL AND HYDROCHLOROTHIAZIDE 12.5; 2 MG/1; MG/1
1 TABLET ORAL DAILY
Qty: 90 TABLET | Refills: 2 | Status: SHIPPED | OUTPATIENT
Start: 2025-01-12

## 2025-01-24 ENCOUNTER — LAB (OUTPATIENT)
Dept: LAB | Facility: LAB | Age: 85
End: 2025-01-24
Payer: MEDICARE

## 2025-01-24 DIAGNOSIS — Z01.818 ENCOUNTER FOR OTHER PREPROCEDURAL EXAMINATION: Primary | ICD-10-CM

## 2025-01-24 PROBLEM — N76.0 BACTERIAL VAGINOSIS: Status: RESOLVED | Noted: 2025-01-24 | Resolved: 2025-01-24

## 2025-01-24 PROBLEM — Z86.79 HISTORY OF HYPERTENSION: Status: ACTIVE | Noted: 2025-01-24

## 2025-01-24 PROBLEM — R07.89 ATYPICAL CHEST PAIN: Status: ACTIVE | Noted: 2025-01-24

## 2025-01-24 PROBLEM — R53.83 MALAISE AND FATIGUE: Status: ACTIVE | Noted: 2025-01-24

## 2025-01-24 PROBLEM — B37.31 CANDIDIASIS OF VAGINA: Status: RESOLVED | Noted: 2025-01-24 | Resolved: 2025-01-24
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
## 2025-01-24 PROBLEM — M79.89 SWELLING OF LOWER EXTREMITY: Status: ACTIVE | Noted: 2025-01-24

## 2025-01-24 PROBLEM — N39.8 DYSFUNCTIONAL VOIDING OF URINE: Status: ACTIVE | Noted: 2025-01-24

## 2025-01-24 PROBLEM — G89.18 POSTOPERATIVE PAIN: Status: ACTIVE | Noted: 2025-01-24

## 2025-01-24 PROBLEM — J01.00 ACUTE MAXILLARY SINUSITIS: Status: RESOLVED | Noted: 2025-01-24 | Resolved: 2025-01-24

## 2025-01-24 PROBLEM — N81.9 VAGINAL VAULT PROLAPSE: Status: ACTIVE | Noted: 2025-01-24

## 2025-01-24 PROBLEM — B96.89 BACTERIAL VAGINOSIS: Status: RESOLVED | Noted: 2025-01-24 | Resolved: 2025-01-24

## 2025-01-24 PROBLEM — N90.89 IRRITATION OF VULVA: Status: RESOLVED | Noted: 2025-01-24 | Resolved: 2025-01-24

## 2025-01-24 PROBLEM — M79.609 PAIN IN EXTREMITY: Status: ACTIVE | Noted: 2025-01-24

## 2025-01-24 PROBLEM — N90.4 DYSTROPHY OF VULVA: Status: ACTIVE | Noted: 2025-01-24

## 2025-01-24 PROBLEM — N95.2 VAGINAL ATROPHY: Status: ACTIVE | Noted: 2025-01-24

## 2025-01-24 PROBLEM — E53.9 VITAMIN B DEFICIENCY: Status: ACTIVE | Noted: 2025-01-24

## 2025-01-24 PROBLEM — N39.41 URGE INCONTINENCE OF URINE: Status: ACTIVE | Noted: 2025-01-24

## 2025-01-24 PROBLEM — Z86.59 HISTORY OF DEPRESSION: Status: ACTIVE | Noted: 2025-01-24

## 2025-01-24 PROBLEM — R53.81 MALAISE AND FATIGUE: Status: ACTIVE | Noted: 2025-01-24

## 2025-01-24 PROBLEM — E78.49 FAMILIAL COMBINED HYPERLIPIDEMIA: Status: ACTIVE | Noted: 2025-01-24

## 2025-01-24 PROBLEM — S20.211A CONTUSION OF RIGHT CHEST WALL: Status: ACTIVE | Noted: 2025-01-24

## 2025-01-24 PROBLEM — M25.559 ARTHRALGIA OF HIP: Status: ACTIVE | Noted: 2025-01-24

## 2025-01-24 PROBLEM — N89.8 VAGINAL DISCHARGE: Status: ACTIVE | Noted: 2025-01-24

## 2025-01-24 PROBLEM — Z86.39 HISTORY OF HYPERCHOLESTEROLEMIA: Status: ACTIVE | Noted: 2025-01-24

## 2025-01-24 PROBLEM — Z86.69 HISTORY OF HEARING LOSS: Status: ACTIVE | Noted: 2025-01-24

## 2025-01-24 PROBLEM — L29.3 PERINEAL IRRITATION: Status: ACTIVE | Noted: 2025-01-24

## 2025-01-24 PROBLEM — R73.03 PREDIABETES: Status: ACTIVE | Noted: 2025-01-24

## 2025-01-24 LAB
ALBUMIN SERPL BCP-MCNC: 4.2 G/DL (ref 3.4–5)
ALP SERPL-CCNC: 58 U/L (ref 33–136)
ALT SERPL W P-5'-P-CCNC: 19 U/L (ref 7–45)
ANION GAP SERPL CALC-SCNC: 12 MMOL/L (ref 10–20)
AST SERPL W P-5'-P-CCNC: 26 U/L (ref 9–39)
BASOPHILS # BLD AUTO: 0.04 X10*3/UL (ref 0–0.1)
BASOPHILS NFR BLD AUTO: 0.7 %
BILIRUB SERPL-MCNC: 0.5 MG/DL (ref 0–1.2)
BUN SERPL-MCNC: 13 MG/DL (ref 6–23)
CALCIUM SERPL-MCNC: 10 MG/DL (ref 8.6–10.6)
CHLORIDE SERPL-SCNC: 96 MMOL/L (ref 98–107)
CO2 SERPL-SCNC: 27 MMOL/L (ref 21–32)
CREAT SERPL-MCNC: 0.69 MG/DL (ref 0.5–1.05)
EGFRCR SERPLBLD CKD-EPI 2021: 87 ML/MIN/1.73M*2
EOSINOPHIL # BLD AUTO: 0.18 X10*3/UL (ref 0–0.4)
EOSINOPHIL NFR BLD AUTO: 3.1 %
ERYTHROCYTE [DISTWIDTH] IN BLOOD BY AUTOMATED COUNT: 12.8 % (ref 11.5–14.5)
GLUCOSE SERPL-MCNC: 84 MG/DL (ref 74–99)
HCT VFR BLD AUTO: 36.4 % (ref 36–46)
HGB BLD-MCNC: 12.2 G/DL (ref 12–16)
IMM GRANULOCYTES # BLD AUTO: 0.01 X10*3/UL (ref 0–0.5)
IMM GRANULOCYTES NFR BLD AUTO: 0.2 % (ref 0–0.9)
LYMPHOCYTES # BLD AUTO: 1.35 X10*3/UL (ref 0.8–3)
LYMPHOCYTES NFR BLD AUTO: 23.1 %
MCH RBC QN AUTO: 33.2 PG (ref 26–34)
MCHC RBC AUTO-ENTMCNC: 33.5 G/DL (ref 32–36)
MCV RBC AUTO: 99 FL (ref 80–100)
MONOCYTES # BLD AUTO: 0.46 X10*3/UL (ref 0.05–0.8)
MONOCYTES NFR BLD AUTO: 7.9 %
NEUTROPHILS # BLD AUTO: 3.81 X10*3/UL (ref 1.6–5.5)
NEUTROPHILS NFR BLD AUTO: 65 %
NRBC BLD-RTO: 0 /100 WBCS (ref 0–0)
PLATELET # BLD AUTO: 261 X10*3/UL (ref 150–450)
POTASSIUM SERPL-SCNC: 4.3 MMOL/L (ref 3.5–5.3)
PROT SERPL-MCNC: 8.7 G/DL (ref 6.4–8.2)
RBC # BLD AUTO: 3.68 X10*6/UL (ref 4–5.2)
SODIUM SERPL-SCNC: 131 MMOL/L (ref 136–145)
WBC # BLD AUTO: 5.9 X10*3/UL (ref 4.4–11.3)

## 2025-01-24 PROCEDURE — 85025 COMPLETE CBC W/AUTO DIFF WBC: CPT

## 2025-01-24 PROCEDURE — 80053 COMPREHEN METABOLIC PANEL: CPT

## 2025-01-27 ENCOUNTER — APPOINTMENT (OUTPATIENT)
Dept: PRIMARY CARE | Facility: CLINIC | Age: 85
End: 2025-01-27
Payer: MEDICARE

## 2025-01-27 VITALS
BODY MASS INDEX: 28.13 KG/M2 | DIASTOLIC BLOOD PRESSURE: 68 MMHG | HEART RATE: 104 BPM | HEIGHT: 61 IN | SYSTOLIC BLOOD PRESSURE: 136 MMHG | WEIGHT: 149 LBS | OXYGEN SATURATION: 93 %

## 2025-01-27 DIAGNOSIS — I10 ESSENTIAL HYPERTENSION, BENIGN: Primary | ICD-10-CM

## 2025-01-27 DIAGNOSIS — F41.1 GENERALIZED ANXIETY DISORDER: ICD-10-CM

## 2025-01-27 DIAGNOSIS — N30.00 ACUTE CYSTITIS WITHOUT HEMATURIA: ICD-10-CM

## 2025-01-27 DIAGNOSIS — E78.2 COMBINED HYPERLIPIDEMIA: ICD-10-CM

## 2025-01-27 DIAGNOSIS — M25.551 RIGHT HIP PAIN: ICD-10-CM

## 2025-01-27 LAB
POC APPEARANCE, URINE: CLEAR
POC BILIRUBIN, URINE: NEGATIVE
POC BLOOD, URINE: NEGATIVE
POC COLOR, URINE: YELLOW
POC GLUCOSE, URINE: NEGATIVE MG/DL
POC KETONES, URINE: NEGATIVE MG/DL
POC LEUKOCYTES, URINE: ABNORMAL
POC NITRITE,URINE: NEGATIVE
POC PH, URINE: 6.5 PH
POC PROTEIN, URINE: NEGATIVE MG/DL
POC SPECIFIC GRAVITY, URINE: 1.01
POC UROBILINOGEN, URINE: 0.2 EU/DL

## 2025-01-27 PROCEDURE — 99214 OFFICE O/P EST MOD 30 MIN: CPT | Performed by: FAMILY MEDICINE

## 2025-01-27 PROCEDURE — 3078F DIAST BP <80 MM HG: CPT | Performed by: FAMILY MEDICINE

## 2025-01-27 PROCEDURE — G2211 COMPLEX E/M VISIT ADD ON: HCPCS | Performed by: FAMILY MEDICINE

## 2025-01-27 PROCEDURE — 81003 URINALYSIS AUTO W/O SCOPE: CPT | Performed by: FAMILY MEDICINE

## 2025-01-27 PROCEDURE — 1160F RVW MEDS BY RX/DR IN RCRD: CPT | Performed by: FAMILY MEDICINE

## 2025-01-27 PROCEDURE — 3075F SYST BP GE 130 - 139MM HG: CPT | Performed by: FAMILY MEDICINE

## 2025-01-27 PROCEDURE — 1159F MED LIST DOCD IN RCRD: CPT | Performed by: FAMILY MEDICINE

## 2025-01-27 PROCEDURE — 1157F ADVNC CARE PLAN IN RCRD: CPT | Performed by: FAMILY MEDICINE

## 2025-01-27 ASSESSMENT — ENCOUNTER SYMPTOMS
UNEXPECTED WEIGHT CHANGE: 0
APPETITE CHANGE: 0
NAUSEA: 0

## 2025-01-27 NOTE — PROGRESS NOTES
"Subjective   Patient ID: Kavita Butts is a 84 y.o. female who presents for Pre-op Exam (Kavita is here today for pre surgical clearance. Pt is supposed to have a THR @ [facility] that is scheduled for 2/4/25. /Pt had pre op apt for PAT on 1/24/25).    HPI   AMANDA no   alcohol no   tobacco no  achieves 4 METS of activity without sx's  history of anesthesia complications no  history of heart disease no  history of lung disease no  bleeding history no  prosthetic joints no  hepatic disease no  renal disease no    Review of Systems   Constitutional:  Negative for appetite change and unexpected weight change.   Eyes:  Negative for visual disturbance.   Gastrointestinal:  Negative for nausea.       Objective   /68 (BP Location: Right arm, Patient Position: Sitting)   Pulse 104   Ht 1.549 m (5' 1\")   Wt 67.6 kg (149 lb)   SpO2 93%   BMI 28.15 kg/m²     Physical Exam  HENT:      Head: Normocephalic and atraumatic.      Nose: Nose normal.      Mouth/Throat:      Mouth: Mucous membranes are moist.      Pharynx: No oropharyngeal exudate.   Eyes:      Extraocular Movements: Extraocular movements intact.      Conjunctiva/sclera: Conjunctivae normal.      Pupils: Pupils are equal, round, and reactive to light.   Cardiovascular:      Rate and Rhythm: Normal rate and regular rhythm.   Pulmonary:      Effort: Pulmonary effort is normal.      Breath sounds: Normal breath sounds.   Abdominal:      General: There is no distension.      Palpations: Abdomen is soft.   Musculoskeletal:      Cervical back: Normal range of motion and neck supple.   Lymphadenopathy:      Cervical: No cervical adenopathy.   Neurological:      General: No focal deficit present.      Mental Status: She is alert.   Psychiatric:         Attention and Perception: Attention normal.         Speech: Speech normal.         Behavior: Behavior is cooperative.         Assessment/Plan   Problem List Items Addressed This Visit             ICD-10-CM    Combined " hyperlipidemia E78.2     Low fat low chol diet and stay active         Essential hypertension, benign - Primary I10     controlled         Generalized anxiety disorder F41.1     Well controlled          Other Visit Diagnoses         Codes    Right hip pain     M25.551        Surg Feb 4th  Moderate risk surgery, average cardiac risk, proceed with surgery  Hold hydrochlorothiazide day of surgery till afterwards when eating again  Reviewed labs  Urine has 3+ leukocyte esterase and will send for urine culture  Recheck 3 months sooner if any issues arise

## 2025-01-29 LAB — BACTERIA UR CULT: NORMAL

## 2025-02-07 DIAGNOSIS — E78.2 COMBINED HYPERLIPIDEMIA: ICD-10-CM

## 2025-02-07 RX ORDER — SIMVASTATIN 40 MG/1
40 TABLET, FILM COATED ORAL DAILY
Qty: 90 TABLET | Refills: 0 | Status: SHIPPED | OUTPATIENT
Start: 2025-02-07

## 2025-02-15 DIAGNOSIS — M48.02 DEGENERATIVE CERVICAL SPINAL STENOSIS: ICD-10-CM

## 2025-02-17 RX ORDER — NABUMETONE 750 MG/1
TABLET, FILM COATED ORAL
Qty: 90 TABLET | Refills: 0 | Status: SHIPPED | OUTPATIENT
Start: 2025-02-17

## 2025-04-22 ENCOUNTER — APPOINTMENT (OUTPATIENT)
Dept: PRIMARY CARE | Facility: CLINIC | Age: 85
End: 2025-04-22
Payer: MEDICARE

## 2025-04-22 VITALS
DIASTOLIC BLOOD PRESSURE: 60 MMHG | WEIGHT: 149 LBS | HEIGHT: 61 IN | BODY MASS INDEX: 28.13 KG/M2 | OXYGEN SATURATION: 97 % | HEART RATE: 74 BPM | SYSTOLIC BLOOD PRESSURE: 120 MMHG

## 2025-04-22 DIAGNOSIS — E78.49 FAMILIAL COMBINED HYPERLIPIDEMIA: ICD-10-CM

## 2025-04-22 DIAGNOSIS — Z00.00 ROUTINE GENERAL MEDICAL EXAMINATION AT HEALTH CARE FACILITY: Primary | ICD-10-CM

## 2025-04-22 DIAGNOSIS — I10 ESSENTIAL HYPERTENSION, BENIGN: ICD-10-CM

## 2025-04-22 DIAGNOSIS — F41.1 GENERALIZED ANXIETY DISORDER: ICD-10-CM

## 2025-04-22 DIAGNOSIS — E53.9 VITAMIN B DEFICIENCY: ICD-10-CM

## 2025-04-22 DIAGNOSIS — Z12.31 ENCOUNTER FOR SCREENING MAMMOGRAM FOR BREAST CANCER: ICD-10-CM

## 2025-04-22 PROBLEM — E78.2 COMBINED HYPERLIPIDEMIA: Status: RESOLVED | Noted: 2023-04-10 | Resolved: 2025-04-22

## 2025-04-22 PROCEDURE — 99214 OFFICE O/P EST MOD 30 MIN: CPT | Performed by: FAMILY MEDICINE

## 2025-04-22 PROCEDURE — 3078F DIAST BP <80 MM HG: CPT | Performed by: FAMILY MEDICINE

## 2025-04-22 PROCEDURE — G0439 PPPS, SUBSEQ VISIT: HCPCS | Performed by: FAMILY MEDICINE

## 2025-04-22 PROCEDURE — 1160F RVW MEDS BY RX/DR IN RCRD: CPT | Performed by: FAMILY MEDICINE

## 2025-04-22 PROCEDURE — 1170F FXNL STATUS ASSESSED: CPT | Performed by: FAMILY MEDICINE

## 2025-04-22 PROCEDURE — 1159F MED LIST DOCD IN RCRD: CPT | Performed by: FAMILY MEDICINE

## 2025-04-22 PROCEDURE — 1036F TOBACCO NON-USER: CPT | Performed by: FAMILY MEDICINE

## 2025-04-22 PROCEDURE — 1157F ADVNC CARE PLAN IN RCRD: CPT | Performed by: FAMILY MEDICINE

## 2025-04-22 PROCEDURE — 3074F SYST BP LT 130 MM HG: CPT | Performed by: FAMILY MEDICINE

## 2025-04-22 RX ORDER — LORAZEPAM 1 MG/1
TABLET ORAL
Qty: 30 TABLET | Refills: 0 | Status: SHIPPED | OUTPATIENT
Start: 2025-04-22

## 2025-04-22 ASSESSMENT — ENCOUNTER SYMPTOMS
APPETITE CHANGE: 0
NAUSEA: 0
UNEXPECTED WEIGHT CHANGE: 0

## 2025-04-22 ASSESSMENT — ACTIVITIES OF DAILY LIVING (ADL)
GROCERY_SHOPPING: INDEPENDENT
DRESSING: INDEPENDENT
MANAGING_FINANCES: INDEPENDENT
BATHING: INDEPENDENT
DOING_HOUSEWORK: INDEPENDENT
TAKING_MEDICATION: INDEPENDENT

## 2025-04-22 ASSESSMENT — PATIENT HEALTH QUESTIONNAIRE - PHQ9
SUM OF ALL RESPONSES TO PHQ9 QUESTIONS 1 AND 2: 0
2. FEELING DOWN, DEPRESSED OR HOPELESS: NOT AT ALL
1. LITTLE INTEREST OR PLEASURE IN DOING THINGS: NOT AT ALL

## 2025-04-22 NOTE — PROGRESS NOTES
"Subjective   Patient ID: Kavita Butts is a 84 y.o. female who presents for Medicare Annual Wellness Visit Subsequent.    HPI   Patient has hx of stable hypertension, hyperlipidemia.  Pt denies chest pain, shortness of breath and edema.  Patient's current treatment as listed in Rx.  Patient is compliant with treatment and complains of no side effects associated treatment.    Review of Systems   Constitutional:  Negative for appetite change and unexpected weight change.   Eyes:  Negative for visual disturbance.   Gastrointestinal:  Negative for nausea.       Objective   /60   Pulse 74   Ht 1.549 m (5' 1\")   Wt 67.6 kg (149 lb)   SpO2 97%   BMI 28.15 kg/m²     Physical Exam  HENT:      Head: Normocephalic and atraumatic.      Nose: Nose normal.      Mouth/Throat:      Mouth: Mucous membranes are moist.      Pharynx: No oropharyngeal exudate.   Eyes:      Extraocular Movements: Extraocular movements intact.      Conjunctiva/sclera: Conjunctivae normal.      Pupils: Pupils are equal, round, and reactive to light.   Cardiovascular:      Rate and Rhythm: Normal rate and regular rhythm.   Pulmonary:      Effort: Pulmonary effort is normal.      Breath sounds: Normal breath sounds.   Abdominal:      General: There is no distension.      Palpations: Abdomen is soft.   Musculoskeletal:      Cervical back: Normal range of motion and neck supple.   Lymphadenopathy:      Cervical: No cervical adenopathy.   Neurological:      General: No focal deficit present.      Mental Status: She is alert.   Psychiatric:         Attention and Perception: Attention normal.         Speech: Speech normal.         Behavior: Behavior is cooperative.         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 I have considered the risks of abuse, dependence, addiction and diversion.  Recheck 6 months sooner if any issues arise

## 2025-05-07 DIAGNOSIS — E78.2 COMBINED HYPERLIPIDEMIA: ICD-10-CM

## 2025-05-07 RX ORDER — SIMVASTATIN 40 MG/1
40 TABLET, FILM COATED ORAL DAILY
Qty: 90 TABLET | Refills: 0 | Status: SHIPPED | OUTPATIENT
Start: 2025-05-07

## 2025-05-12 ENCOUNTER — OFFICE VISIT (OUTPATIENT)
Dept: PRIMARY CARE | Facility: CLINIC | Age: 85
End: 2025-05-12
Payer: MEDICARE

## 2025-05-12 VITALS
SYSTOLIC BLOOD PRESSURE: 118 MMHG | HEART RATE: 72 BPM | TEMPERATURE: 97 F | WEIGHT: 148 LBS | OXYGEN SATURATION: 99 % | BODY MASS INDEX: 27.96 KG/M2 | DIASTOLIC BLOOD PRESSURE: 72 MMHG

## 2025-05-12 DIAGNOSIS — M79.642 LEFT HAND PAIN: Primary | ICD-10-CM

## 2025-05-12 DIAGNOSIS — L03.114 CELLULITIS OF LEFT HAND: ICD-10-CM

## 2025-05-12 DIAGNOSIS — I10 ESSENTIAL HYPERTENSION, BENIGN: ICD-10-CM

## 2025-05-12 PROCEDURE — 1159F MED LIST DOCD IN RCRD: CPT | Performed by: FAMILY MEDICINE

## 2025-05-12 PROCEDURE — 99213 OFFICE O/P EST LOW 20 MIN: CPT | Performed by: FAMILY MEDICINE

## 2025-05-12 PROCEDURE — 3074F SYST BP LT 130 MM HG: CPT | Performed by: FAMILY MEDICINE

## 2025-05-12 PROCEDURE — G2211 COMPLEX E/M VISIT ADD ON: HCPCS | Performed by: FAMILY MEDICINE

## 2025-05-12 PROCEDURE — 3078F DIAST BP <80 MM HG: CPT | Performed by: FAMILY MEDICINE

## 2025-05-12 PROCEDURE — 1036F TOBACCO NON-USER: CPT | Performed by: FAMILY MEDICINE

## 2025-05-12 PROCEDURE — 1160F RVW MEDS BY RX/DR IN RCRD: CPT | Performed by: FAMILY MEDICINE

## 2025-05-12 RX ORDER — PREDNISONE 10 MG/1
TABLET ORAL
Qty: 14 TABLET | Refills: 0 | Status: SHIPPED | OUTPATIENT
Start: 2025-05-12 | End: 2025-05-20

## 2025-05-12 RX ORDER — CEPHALEXIN 500 MG/1
500 CAPSULE ORAL 4 TIMES DAILY
Qty: 28 CAPSULE | Refills: 0 | Status: SHIPPED | OUTPATIENT
Start: 2025-05-12 | End: 2025-05-19

## 2025-05-12 ASSESSMENT — PATIENT HEALTH QUESTIONNAIRE - PHQ9
2. FEELING DOWN, DEPRESSED OR HOPELESS: NOT AT ALL
1. LITTLE INTEREST OR PLEASURE IN DOING THINGS: NOT AT ALL
SUM OF ALL RESPONSES TO PHQ9 QUESTIONS 1 AND 2: 0

## 2025-05-12 ASSESSMENT — ENCOUNTER SYMPTOMS
NAUSEA: 0
APPETITE CHANGE: 0
UNEXPECTED WEIGHT CHANGE: 0

## 2025-05-12 NOTE — PROGRESS NOTES
Subjective   Patient ID: Kavita Butts is a 84 y.o. female who presents for Sick Visit (Kavita is here today for same day sick visit is here today with c/o right hand pain , swelling and redness x2 days. Pt reports that her hand is very painful. ).    HPI   No history of gout  No cuts or recent trauma to left hand  No fever chills  Redness has not changed over the last 24 hours    Review of Systems   Constitutional:  Negative for appetite change and unexpected weight change.   Eyes:  Negative for visual disturbance.   Gastrointestinal:  Negative for nausea.       Objective   /72 (BP Location: Right arm, Patient Position: Sitting)   Pulse 72   Temp 36.1 °C (97 °F) (Temporal)   Wt 67.1 kg (148 lb)   SpO2 99%   BMI 27.96 kg/m²     Physical Exam  HENT:      Head: Normocephalic and atraumatic.      Nose: Nose normal.      Mouth/Throat:      Mouth: Mucous membranes are moist.      Pharynx: No oropharyngeal exudate.   Eyes:      Extraocular Movements: Extraocular movements intact.      Conjunctiva/sclera: Conjunctivae normal.      Pupils: Pupils are equal, round, and reactive to light.   Cardiovascular:      Rate and Rhythm: Normal rate and regular rhythm.   Pulmonary:      Effort: Pulmonary effort is normal.      Breath sounds: Normal breath sounds.   Abdominal:      General: There is no distension.      Palpations: Abdomen is soft.   Musculoskeletal:      Cervical back: Normal range of motion and neck supple.   Lymphadenopathy:      Cervical: No cervical adenopathy.   Neurological:      General: No focal deficit present.      Mental Status: She is alert.   Psychiatric:         Attention and Perception: Attention normal.         Speech: Speech normal.         Behavior: Behavior is cooperative.     Left hand proximal ulnar aspect of palm very red and tender and swollen with some extension to the wrist and thenar aspect of the hand as well over the palm    Assessment/Plan   Problem List Items Addressed This  Visit           ICD-10-CM    Essential hypertension, benign I10    Relevant Orders    CBC and Auto Differential    Uric Acid     Other Visit Diagnoses         Codes      Left hand pain    -  Primary M79.642    Relevant Medications    predniSONE (Deltasone) 10 mg tablet    Other Relevant Orders    CBC and Auto Differential    Uric Acid      Cellulitis of left hand     L03.114    Relevant Medications    cephalexin (Keflex) 500 mg capsule    Other Relevant Orders    CBC and Auto Differential        Cellulitis or possibly gout and we will check labs to try to differentiate  Risk benefits discussed and will treat for both  Supportive care  Recheck 1 week if not better sooner if worse

## 2025-05-13 ENCOUNTER — TELEPHONE (OUTPATIENT)
Dept: PRIMARY CARE | Facility: CLINIC | Age: 85
End: 2025-05-13
Payer: MEDICARE

## 2025-05-13 LAB
BASOPHILS # BLD AUTO: 39 CELLS/UL (ref 0–200)
BASOPHILS NFR BLD AUTO: 0.5 %
EOSINOPHIL # BLD AUTO: 223 CELLS/UL (ref 15–500)
EOSINOPHIL NFR BLD AUTO: 2.9 %
ERYTHROCYTE [DISTWIDTH] IN BLOOD BY AUTOMATED COUNT: 11.6 % (ref 11–15)
HCT VFR BLD AUTO: 37.5 % (ref 35–45)
HGB BLD-MCNC: 12.3 G/DL (ref 11.7–15.5)
LYMPHOCYTES # BLD AUTO: 1417 CELLS/UL (ref 850–3900)
LYMPHOCYTES NFR BLD AUTO: 18.4 %
MCH RBC QN AUTO: 33.2 PG (ref 27–33)
MCHC RBC AUTO-ENTMCNC: 32.8 G/DL (ref 32–36)
MCV RBC AUTO: 101.4 FL (ref 80–100)
MONOCYTES # BLD AUTO: 616 CELLS/UL (ref 200–950)
MONOCYTES NFR BLD AUTO: 8 %
NEUTROPHILS # BLD AUTO: 5405 CELLS/UL (ref 1500–7800)
NEUTROPHILS NFR BLD AUTO: 70.2 %
PLATELET # BLD AUTO: 262 THOUSAND/UL (ref 140–400)
PMV BLD REES-ECKER: 9.7 FL (ref 7.5–12.5)
RBC # BLD AUTO: 3.7 MILLION/UL (ref 3.8–5.1)
URATE SERPL-MCNC: 5.2 MG/DL (ref 2.5–7)
WBC # BLD AUTO: 7.7 THOUSAND/UL (ref 3.8–10.8)

## 2025-05-13 NOTE — TELEPHONE ENCOUNTER
Spoke with pt and she said she is doing much better today.  The redness has gotten better, she can touch her joints gently today which she couldn't yesterday.  Pt states that there is still some warmth to the area.  PT also noted that she has been eating a lot of asparagus in the last couple weeks and researched that asparagus can be bad for arthritis.  PT is taking the prescription that was prescribed.

## 2025-05-13 NOTE — TELEPHONE ENCOUNTER
----- Message from Dalton Norris sent at 5/13/2025  8:06 AM EDT -----  Blood work is reassuring with no sign of serious infection and a low uric acid level  Please see if patient is doing better today  ----- Message -----  From: Kristie [facility] Results In  Sent: 5/13/2025   5:05 AM EDT  To: Dalton Norris MD

## 2025-05-14 DIAGNOSIS — M48.02 DEGENERATIVE CERVICAL SPINAL STENOSIS: ICD-10-CM

## 2025-05-15 RX ORDER — NABUMETONE 750 MG/1
375 TABLET, FILM COATED ORAL 2 TIMES DAILY PRN
Qty: 90 TABLET | Refills: 0 | Status: SHIPPED | OUTPATIENT
Start: 2025-05-15

## 2025-05-16 DIAGNOSIS — M48.02 DEGENERATIVE CERVICAL SPINAL STENOSIS: ICD-10-CM

## 2025-05-19 RX ORDER — NABUMENTONE 750 MG/1
375 TABLET ORAL 2 TIMES DAILY PRN
Qty: 90 TABLET | Refills: 0 | Status: SHIPPED | OUTPATIENT
Start: 2025-05-19
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
## 2025-06-02 ENCOUNTER — OFFICE VISIT (OUTPATIENT)
Dept: OBSTETRICS AND GYNECOLOGY | Facility: CLINIC | Age: 85
End: 2025-06-02
Payer: MEDICARE

## 2025-06-02 VITALS
DIASTOLIC BLOOD PRESSURE: 73 MMHG | WEIGHT: 151.4 LBS | SYSTOLIC BLOOD PRESSURE: 162 MMHG | HEIGHT: 62 IN | HEART RATE: 76 BPM | BODY MASS INDEX: 27.86 KG/M2

## 2025-06-02 DIAGNOSIS — N39.0 RECURRENT UTI: Primary | ICD-10-CM

## 2025-06-02 LAB
POC APPEARANCE, URINE: CLEAR
POC BILIRUBIN, URINE: NEGATIVE
POC BLOOD, URINE: ABNORMAL
POC COLOR, URINE: YELLOW
POC GLUCOSE, URINE: NEGATIVE MG/DL
POC KETONES, URINE: NEGATIVE MG/DL
POC LEUKOCYTES, URINE: ABNORMAL
POC NITRITE,URINE: NEGATIVE
POC PH, URINE: 6.5 PH
POC PROTEIN, URINE: NEGATIVE MG/DL
POC SPECIFIC GRAVITY, URINE: 1.01
POC UROBILINOGEN, URINE: 0.2 EU/DL

## 2025-06-02 PROCEDURE — 3077F SYST BP >= 140 MM HG: CPT | Performed by: OBSTETRICS & GYNECOLOGY

## 2025-06-02 PROCEDURE — 51798 US URINE CAPACITY MEASURE: CPT | Performed by: OBSTETRICS & GYNECOLOGY

## 2025-06-02 PROCEDURE — 3078F DIAST BP <80 MM HG: CPT | Performed by: OBSTETRICS & GYNECOLOGY

## 2025-06-02 PROCEDURE — 1126F AMNT PAIN NOTED NONE PRSNT: CPT | Performed by: OBSTETRICS & GYNECOLOGY

## 2025-06-02 PROCEDURE — 81003 URINALYSIS AUTO W/O SCOPE: CPT | Mod: QW | Performed by: OBSTETRICS & GYNECOLOGY

## 2025-06-02 PROCEDURE — 1159F MED LIST DOCD IN RCRD: CPT | Performed by: OBSTETRICS & GYNECOLOGY

## 2025-06-02 PROCEDURE — 1036F TOBACCO NON-USER: CPT | Performed by: OBSTETRICS & GYNECOLOGY

## 2025-06-02 PROCEDURE — 99213 OFFICE O/P EST LOW 20 MIN: CPT | Mod: 25 | Performed by: OBSTETRICS & GYNECOLOGY

## 2025-06-02 PROCEDURE — 99213 OFFICE O/P EST LOW 20 MIN: CPT | Performed by: OBSTETRICS & GYNECOLOGY

## 2025-06-02 ASSESSMENT — ENCOUNTER SYMPTOMS
CONSTITUTIONAL NEGATIVE: 1
EYES NEGATIVE: 1
LOSS OF SENSATION IN FEET: 0
NEUROLOGICAL NEGATIVE: 1
MUSCULOSKELETAL NEGATIVE: 1
RESPIRATORY NEGATIVE: 1
GASTROINTESTINAL NEGATIVE: 1
PSYCHIATRIC NEGATIVE: 1
CARDIOVASCULAR NEGATIVE: 1
DEPRESSION: 0
OCCASIONAL FEELINGS OF UNSTEADINESS: 0
ENDOCRINE NEGATIVE: 1

## 2025-06-02 ASSESSMENT — PAIN SCALES - GENERAL: PAINLEVEL_OUTOF10: 0-NO PAIN

## 2025-06-16 ENCOUNTER — TELEPHONE (OUTPATIENT)
Dept: PRIMARY CARE | Facility: CLINIC | Age: 85
End: 2025-06-16
Payer: MEDICARE

## 2025-06-16 DIAGNOSIS — M54.16 CHRONIC LUMBAR RADICULOPATHY: ICD-10-CM

## 2025-06-16 RX ORDER — GABAPENTIN 300 MG/1
300 CAPSULE ORAL 2 TIMES DAILY
Qty: 180 CAPSULE | Refills: 0 | Status: SHIPPED | OUTPATIENT
Start: 2025-06-16

## 2025-06-16 NOTE — TELEPHONE ENCOUNTER
Medication Refill    Pharmacy: [ [facility] ]    Medication: [ Gabapentin 300 mg ]    Quantity: [ 90 day ]    LOV: [ 05/12/25 ]    NOV: [ 10/23/25 ]    PT states they have 2 weeks left.

## 2025-06-23 ENCOUNTER — TELEPHONE (OUTPATIENT)
Dept: PRIMARY CARE | Facility: CLINIC | Age: 85
End: 2025-06-23
Payer: MEDICARE

## 2025-06-23 NOTE — TELEPHONE ENCOUNTER
"PT is requesting a letter from Dr. ANGELA stating that the PT's goal weight is 150 lbs. PT states that it is for \"Take off pounds sensibly group\".    PT would appreciate a call back when this can be done.  "

## 2025-06-29 ENCOUNTER — APPOINTMENT (OUTPATIENT)
Dept: RADIOLOGY | Facility: HOSPITAL | Age: 85
End: 2025-06-29
Payer: MEDICARE

## 2025-06-29 ENCOUNTER — APPOINTMENT (OUTPATIENT)
Dept: CARDIOLOGY | Facility: HOSPITAL | Age: 85
End: 2025-06-29
Payer: MEDICARE

## 2025-06-29 ENCOUNTER — HOSPITAL ENCOUNTER (EMERGENCY)
Facility: HOSPITAL | Age: 85
Discharge: HOME | End: 2025-06-29
Attending: EMERGENCY MEDICINE
Payer: MEDICARE

## 2025-06-29 VITALS
HEIGHT: 61 IN | TEMPERATURE: 99 F | OXYGEN SATURATION: 99 % | WEIGHT: 147 LBS | DIASTOLIC BLOOD PRESSURE: 76 MMHG | SYSTOLIC BLOOD PRESSURE: 144 MMHG | RESPIRATION RATE: 18 BRPM | HEART RATE: 71 BPM | BODY MASS INDEX: 27.75 KG/M2

## 2025-06-29 DIAGNOSIS — M54.6 ACUTE BILATERAL THORACIC BACK PAIN: Primary | ICD-10-CM

## 2025-06-29 DIAGNOSIS — E87.1 HYPONATREMIA: ICD-10-CM

## 2025-06-29 LAB
ALBUMIN SERPL BCP-MCNC: 3.9 G/DL (ref 3.4–5)
ALP SERPL-CCNC: 56 U/L (ref 33–136)
ALT SERPL W P-5'-P-CCNC: 14 U/L (ref 7–45)
ANION GAP SERPL CALC-SCNC: 13 MMOL/L (ref 10–20)
AST SERPL W P-5'-P-CCNC: 18 U/L (ref 9–39)
BASOPHILS # BLD AUTO: 0.04 X10*3/UL (ref 0–0.1)
BASOPHILS NFR BLD AUTO: 0.4 %
BILIRUB SERPL-MCNC: 0.8 MG/DL (ref 0–1.2)
BUN SERPL-MCNC: 14 MG/DL (ref 6–23)
CALCIUM SERPL-MCNC: 9.7 MG/DL (ref 8.6–10.3)
CARDIAC TROPONIN I PNL SERPL HS: 8 NG/L (ref 0–13)
CHLORIDE SERPL-SCNC: 91 MMOL/L (ref 98–107)
CO2 SERPL-SCNC: 25 MMOL/L (ref 21–32)
CREAT SERPL-MCNC: 0.71 MG/DL (ref 0.5–1.05)
EGFRCR SERPLBLD CKD-EPI 2021: 85 ML/MIN/1.73M*2
EOSINOPHIL # BLD AUTO: 0.3 X10*3/UL (ref 0–0.4)
EOSINOPHIL NFR BLD AUTO: 3.4 %
ERYTHROCYTE [DISTWIDTH] IN BLOOD BY AUTOMATED COUNT: 12.9 % (ref 11.5–14.5)
GLUCOSE SERPL-MCNC: 97 MG/DL (ref 74–99)
HCT VFR BLD AUTO: 35.8 % (ref 36–46)
HGB BLD-MCNC: 11.7 G/DL (ref 12–16)
IMM GRANULOCYTES # BLD AUTO: 0.02 X10*3/UL (ref 0–0.5)
IMM GRANULOCYTES NFR BLD AUTO: 0.2 % (ref 0–0.9)
LYMPHOCYTES # BLD AUTO: 1.47 X10*3/UL (ref 0.8–3)
LYMPHOCYTES NFR BLD AUTO: 16.5 %
MCH RBC QN AUTO: 33.7 PG (ref 26–34)
MCHC RBC AUTO-ENTMCNC: 32.7 G/DL (ref 32–36)
MCV RBC AUTO: 103 FL (ref 80–100)
MONOCYTES # BLD AUTO: 0.82 X10*3/UL (ref 0.05–0.8)
MONOCYTES NFR BLD AUTO: 9.2 %
NEUTROPHILS # BLD AUTO: 6.28 X10*3/UL (ref 1.6–5.5)
NEUTROPHILS NFR BLD AUTO: 70.3 %
NRBC BLD-RTO: 0 /100 WBCS (ref 0–0)
PLATELET # BLD AUTO: 225 X10*3/UL (ref 150–450)
POTASSIUM SERPL-SCNC: 3.8 MMOL/L (ref 3.5–5.3)
PROT SERPL-MCNC: 8.5 G/DL (ref 6.4–8.2)
RBC # BLD AUTO: 3.47 X10*6/UL (ref 4–5.2)
SODIUM SERPL-SCNC: 125 MMOL/L (ref 136–145)
WBC # BLD AUTO: 8.9 X10*3/UL (ref 4.4–11.3)

## 2025-06-29 PROCEDURE — 72070 X-RAY EXAM THORAC SPINE 2VWS: CPT

## 2025-06-29 PROCEDURE — 99285 EMERGENCY DEPT VISIT HI MDM: CPT | Performed by: EMERGENCY MEDICINE

## 2025-06-29 PROCEDURE — 71045 X-RAY EXAM CHEST 1 VIEW: CPT | Performed by: RADIOLOGY

## 2025-06-29 PROCEDURE — 36415 COLL VENOUS BLD VENIPUNCTURE: CPT

## 2025-06-29 PROCEDURE — 85025 COMPLETE CBC W/AUTO DIFF WBC: CPT

## 2025-06-29 PROCEDURE — 72070 X-RAY EXAM THORAC SPINE 2VWS: CPT | Performed by: RADIOLOGY

## 2025-06-29 PROCEDURE — 93005 ELECTROCARDIOGRAM TRACING: CPT

## 2025-06-29 PROCEDURE — 71045 X-RAY EXAM CHEST 1 VIEW: CPT

## 2025-06-29 PROCEDURE — 84484 ASSAY OF TROPONIN QUANT: CPT

## 2025-06-29 PROCEDURE — 80053 COMPREHEN METABOLIC PANEL: CPT

## 2025-06-29 ASSESSMENT — PAIN - FUNCTIONAL ASSESSMENT: PAIN_FUNCTIONAL_ASSESSMENT: 0-10

## 2025-06-29 ASSESSMENT — PAIN SCALES - GENERAL: PAINLEVEL_OUTOF10: 8

## 2025-06-29 ASSESSMENT — PAIN DESCRIPTION - LOCATION: LOCATION: BACK

## 2025-06-29 ASSESSMENT — PAIN DESCRIPTION - ORIENTATION: ORIENTATION: UPPER
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
## 2025-06-29 NOTE — DISCHARGE INSTRUCTIONS
You presented to the ED for upper back pain.  Your pain resolved with Tylenol and a lidocaine patch.  You are also noted to have hyponatremia.  Follow-up with your primary care physician within the next 2 to 3 days for further evaluation of sodium and repeat lab work.

## 2025-06-29 NOTE — ED PROVIDER NOTES
The patient was seen by the midlevel/resident.  I have personally saw the patient and made/approved the management plan and take responsibility for the patient management.  I reviewed the EKG's (when done) and agree with the interpretation.  I have seen and examined the patient; agree with the workup, evaluation, MDM, and diagnosis.  The care plan has been discussed with the midlevel/resident; I have reviewed the note and agree with the documented findings.     Presents with complaint of back pain.  She feels that she has significant spasms in her back and has had problems in the past with back discomfort and previous surgeries.  She did not have any trauma or falls.  She thinks this may have been due to bending while doing stretching yesterday.  Patient was given medications and is slightly improved in the ED.  Awaiting workup.  Considering cardiac etiology as well. No life threatening cause found. Suspect musculoskeletal.   ED Course as of 06/30/25 1344   Sun Jun 29, 2025   1740 Metabolic panel sent for for hyponatremia.  Troponin normal.  CBC significant for no leukocytosis and mild anemia with a hemoglobin 11.7.  No active bleeding noted. []   1740 CXR without acute cardiopulmonary process.  X-ray of the thoracic spine without acute osseous process. []   1740 XR thoracic spine 2 views  IMPRESSION:  Moderate spondylosis at the thoracolumbar junction. 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  When compared to EKG on 1/2/20 review of EKG does not show any signs of STEMI, complete heart block, asystole, V-fib. []   Mon Jun 30, 2025   1055 Supervising resident attestation:  Patient is an 84-year-old female with a past medical history of hypertension and hyperlipidemia who presents to the ED for thoracic back pain.  She noted that she began to have spasming of her thoracic back  after paper shredding.  No that she took Tylenol with codeine as well as an over-the-counter lidocaine patch without improvement of her symptoms.  Patient now notes resolution of her symptoms.  Patient is hemodynamically stable.  Low clinical concern for PE given patient is not noting pleuritic chest pain and does not have tachycardia or hypoxia.  Low clinical concern for pneumothorax with bilateral breath sounds.  Low clinical concern for aortic dissection with equal pulses of the bilateral upper and lower extremities.  Patient noted to have mild paraspinal thoracic TTP.  Patient declined any analgesia.  Cardiac workup was unremarkable.  Patient was noted to have hyponatremia of 125.  Patient is asymptomatic.  Patient does note that she has been decreasing her salt intake.  Discussed ED findings, supportive management of symptoms, plan for outpatient primary care follow-up for asymptomatic hyponatremia, and strict return precautions for any new or worsening symptoms including but not limited to concerning pain and changes in mental status.    Independently seen and evaluated.  In agreement with resident/BRENDAN plan. [MH]      ED Course User Index  [MH] Donn Boston MD         Diagnoses as of 06/30/25 1344   Acute bilateral thoracic back pain   Hyponatremia     MD Isaías Roach MD  06/29/25 2122       Isaías Sahu MD  06/30/25 1344

## 2025-06-30 ENCOUNTER — OFFICE VISIT (OUTPATIENT)
Dept: PRIMARY CARE | Facility: CLINIC | Age: 85
End: 2025-06-30
Payer: MEDICARE

## 2025-06-30 VITALS
HEART RATE: 61 BPM | SYSTOLIC BLOOD PRESSURE: 138 MMHG | TEMPERATURE: 96.9 F | OXYGEN SATURATION: 97 % | WEIGHT: 147 LBS | DIASTOLIC BLOOD PRESSURE: 82 MMHG | HEIGHT: 61 IN | BODY MASS INDEX: 27.75 KG/M2

## 2025-06-30 DIAGNOSIS — N95.9 POSTMENOPAUSAL SYMPTOMS: ICD-10-CM

## 2025-06-30 DIAGNOSIS — I10 ESSENTIAL HYPERTENSION, BENIGN: Primary | ICD-10-CM

## 2025-06-30 DIAGNOSIS — M54.6 ACUTE BILATERAL THORACIC BACK PAIN: ICD-10-CM

## 2025-06-30 DIAGNOSIS — E87.1 HYPONATREMIA: ICD-10-CM

## 2025-06-30 LAB
ATRIAL RATE: 66 BPM
P AXIS: 69 DEGREES
P OFFSET: 164 MS
P ONSET: 140 MS
PR INTERVAL: 170 MS
Q ONSET: 225 MS
QRS COUNT: 11 BEATS
QRS DURATION: 132 MS
QT INTERVAL: 404 MS
QTC CALCULATION(BAZETT): 423 MS
QTC FREDERICIA: 417 MS
R AXIS: -48 DEGREES
T AXIS: 32 DEGREES
T OFFSET: 427 MS
VENTRICULAR RATE: 66 BPM

## 2025-06-30 PROCEDURE — 3075F SYST BP GE 130 - 139MM HG: CPT | Performed by: FAMILY MEDICINE

## 2025-06-30 PROCEDURE — 1159F MED LIST DOCD IN RCRD: CPT | Performed by: FAMILY MEDICINE

## 2025-06-30 PROCEDURE — 99213 OFFICE O/P EST LOW 20 MIN: CPT | Performed by: FAMILY MEDICINE

## 2025-06-30 PROCEDURE — 3079F DIAST BP 80-89 MM HG: CPT | Performed by: FAMILY MEDICINE

## 2025-06-30 PROCEDURE — 1036F TOBACCO NON-USER: CPT | Performed by: FAMILY MEDICINE

## 2025-06-30 PROCEDURE — 1160F RVW MEDS BY RX/DR IN RCRD: CPT | Performed by: FAMILY MEDICINE

## 2025-06-30 PROCEDURE — G2211 COMPLEX E/M VISIT ADD ON: HCPCS | Performed by: FAMILY MEDICINE

## 2025-06-30 RX ORDER — AMLODIPINE BESYLATE 5 MG/1
5 TABLET ORAL DAILY
Qty: 90 TABLET | Refills: 1 | Status: SHIPPED | OUTPATIENT
Start: 2025-06-30 | End: 2025-12-27

## 2025-06-30 ASSESSMENT — ENCOUNTER SYMPTOMS
NAUSEA: 0
APPETITE CHANGE: 0
UNEXPECTED WEIGHT CHANGE: 0

## 2025-06-30 NOTE — PATIENT INSTRUCTIONS
Stop lisinopril/hydrochlorothiazide and stop simvastatin    Start amlodipine one a day    In one week get a repeat lab drawn for sodium.     Get DEXA scan done  to schedule

## 2025-06-30 NOTE — PROGRESS NOTES
"Subjective   Patient ID: Kavita Butts is a 84 y.o. female who presents for Sick Visit (Kavita is here today for same day sick visit with c/o mid back pain. Pt went to ED on 6/29/25 for this and xrays were obtained. ).    HPI   c/o back pain, no numbness tingling weakness or change in bowel or bladder, no fever or chills, no blood in urine, no rash, no trauma, better with rest  Patient says in the emergency department her sodium was lower than usual  Says she used to drink water from a water softener which was salty but now has city water    Review of Systems   Constitutional:  Negative for appetite change and unexpected weight change.   Eyes:  Negative for visual disturbance.   Gastrointestinal:  Negative for nausea.       Objective   /82 (BP Location: Left arm, Patient Position: Sitting)   Pulse 61   Temp 36.1 °C (96.9 °F) (Temporal)   Ht (!) 1.549 m (5' 1\")   Wt 66.7 kg (147 lb)   SpO2 97%   BMI 27.78 kg/m²     Physical Exam  HENT:      Head: Normocephalic and atraumatic.      Nose: Nose normal.      Mouth/Throat:      Mouth: Mucous membranes are moist.      Pharynx: No oropharyngeal exudate.   Eyes:      Extraocular Movements: Extraocular movements intact.      Conjunctiva/sclera: Conjunctivae normal.      Pupils: Pupils are equal, round, and reactive to light.   Cardiovascular:      Rate and Rhythm: Normal rate and regular rhythm.   Pulmonary:      Effort: Pulmonary effort is normal.      Breath sounds: Normal breath sounds.   Abdominal:      General: There is no distension.      Palpations: Abdomen is soft.   Musculoskeletal:      Cervical back: Normal range of motion and neck supple.   Lymphadenopathy:      Cervical: No cervical adenopathy.   Neurological:      General: No focal deficit present.      Mental Status: She is alert.   Psychiatric:         Attention and Perception: Attention normal.         Speech: Speech normal.         Behavior: Behavior is cooperative.         Assessment/Plan "   Problem List Items Addressed This Visit           ICD-10-CM    Essential hypertension, benign - Primary I10    Relevant Medications    amLODIPine (Norvasc) 5 mg tablet    Other Relevant Orders    Basic Metabolic Panel     Other Visit Diagnoses         Codes      Hyponatremia     E87.1    Relevant Orders    Basic Metabolic Panel      Postmenopausal symptoms     N95.9    Relevant Orders    XR DEXA bone density        DC lisinopril hydrochlorothiazide and simvastatin  Start amlodipine  Discussed risk benefits  Follow blood pressure at home  Get labs drawn in about 1 week   LM discussed  Recheck 2 months sooner if any issues arise

## 2025-07-02 LAB
ANION GAP SERPL CALCULATED.4IONS-SCNC: 9 MMOL/L (CALC) (ref 7–17)
BUN SERPL-MCNC: 12 MG/DL (ref 7–25)
BUN/CREAT SERPL: ABNORMAL (CALC) (ref 6–22)
CALCIUM SERPL-MCNC: 10.1 MG/DL (ref 8.6–10.4)
CHLORIDE SERPL-SCNC: 95 MMOL/L (ref 98–110)
CO2 SERPL-SCNC: 26 MMOL/L (ref 20–32)
CREAT SERPL-MCNC: 0.6 MG/DL (ref 0.6–0.95)
EGFRCR SERPLBLD CKD-EPI 2021: 90 ML/MIN/1.73M2
GLUCOSE SERPL-MCNC: 115 MG/DL (ref 65–139)
POTASSIUM SERPL-SCNC: 4.4 MMOL/L (ref 3.5–5.3)
SODIUM SERPL-SCNC: 130 MMOL/L (ref 135–146)

## 2025-07-05 ENCOUNTER — HOSPITAL ENCOUNTER (OUTPATIENT)
Dept: RADIOLOGY | Facility: HOSPITAL | Age: 85
Discharge: HOME | End: 2025-07-05
Payer: MEDICARE

## 2025-07-05 DIAGNOSIS — N95.9 POSTMENOPAUSAL SYMPTOMS: ICD-10-CM

## 2025-07-05 PROCEDURE — 77080 DXA BONE DENSITY AXIAL: CPT | Performed by: RADIOLOGY

## 2025-07-05 PROCEDURE — 77080 DXA BONE DENSITY AXIAL: CPT

## 2025-07-08 ENCOUNTER — TELEPHONE (OUTPATIENT)
Dept: PRIMARY CARE | Facility: CLINIC | Age: 85
End: 2025-07-08
Payer: MEDICARE

## 2025-07-08 DIAGNOSIS — E87.1 HYPONATREMIA: ICD-10-CM

## 2025-07-08 DIAGNOSIS — I10 PRIMARY HYPERTENSION: ICD-10-CM

## 2025-07-08 NOTE — TELEPHONE ENCOUNTER
Pt came into office and needed lab order for BMP to repeat per dr pardo's instructions.   BMP ordered

## 2025-07-09 ENCOUNTER — TELEPHONE (OUTPATIENT)
Dept: PRIMARY CARE | Facility: CLINIC | Age: 85
End: 2025-07-09
Payer: MEDICARE

## 2025-07-09 LAB
ANION GAP SERPL CALCULATED.4IONS-SCNC: 13 MMOL/L (CALC) (ref 7–17)
BUN SERPL-MCNC: 17 MG/DL (ref 7–25)
BUN/CREAT SERPL: 18 (CALC) (ref 6–22)
CALCIUM SERPL-MCNC: 10.3 MG/DL (ref 8.6–10.4)
CHLORIDE SERPL-SCNC: 102 MMOL/L (ref 98–110)
CO2 SERPL-SCNC: 21 MMOL/L (ref 20–32)
CREAT SERPL-MCNC: 0.96 MG/DL (ref 0.6–0.95)
EGFRCR SERPLBLD CKD-EPI 2021: 59 ML/MIN/1.73M2
GLUCOSE SERPL-MCNC: 134 MG/DL (ref 65–139)
POTASSIUM SERPL-SCNC: 4.2 MMOL/L (ref 3.5–5.3)
SODIUM SERPL-SCNC: 136 MMOL/L (ref 135–146)

## 2025-07-09 NOTE — TELEPHONE ENCOUNTER
Result Communication    Resulted Orders   Basic metabolic panel   Result Value Ref Range    GLUCOSE 134 65 - 139 mg/dL      Comment:                Non-fasting reference interval     For someone without known diabetes, a glucose  value >125 mg/dL indicates that they may have  diabetes and this should be confirmed with a  follow-up test.         UREA NITROGEN (BUN) 17 7 - 25 mg/dL    CREATININE 0.96 (H) 0.60 - 0.95 mg/dL    EGFR 59 (L) > OR = 60 mL/min/1.73m2    BUN/CREATININE RATIO 18 6 - 22 (calc)    SODIUM 136 135 - 146 mmol/L    POTASSIUM 4.2 3.5 - 5.3 mmol/L    CHLORIDE 102 98 - 110 mmol/L    CARBON DIOXIDE 21 20 - 32 mmol/L    ELECTROLYTE BALANCE 13 7 - 17 mmol/L (calc)    CALCIUM 10.3 8.6 - 10.4 mg/dL    Narrative    FASTING:NO    FASTING: NO       12:06 PM      Results were successfully communicated with the patient and they acknowledged their understanding.

## 2025-07-09 NOTE — TELEPHONE ENCOUNTER
----- Message from Dalton Norris sent at 7/9/2025  8:31 AM EDT -----  Excellent, sodium is totally back to normal  ----- Message -----  From: Krist[facility] Results In  Sent: 7/9/2025   7:53 AM EDT  To: Dalton Norris MD

## 2025-10-23 ENCOUNTER — APPOINTMENT (OUTPATIENT)
Dept: PRIMARY CARE | Facility: CLINIC | Age: 85
End: 2025-10-23
Payer: MEDICARE